# Patient Record
Sex: MALE | Race: WHITE | NOT HISPANIC OR LATINO | URBAN - METROPOLITAN AREA
[De-identification: names, ages, dates, MRNs, and addresses within clinical notes are randomized per-mention and may not be internally consistent; named-entity substitution may affect disease eponyms.]

---

## 2023-03-31 DIAGNOSIS — K21.9 GASTROESOPHAGEAL REFLUX DISEASE WITHOUT ESOPHAGITIS: Primary | ICD-10-CM

## 2023-03-31 RX ORDER — PANTOPRAZOLE SODIUM 40 MG/1
TABLET, DELAYED RELEASE ORAL
Qty: 90 TABLET | Refills: 0 | Status: SHIPPED | OUTPATIENT
Start: 2023-03-31 | End: 2023-04-04 | Stop reason: SDUPTHER

## 2023-04-04 DIAGNOSIS — K21.9 GASTROESOPHAGEAL REFLUX DISEASE WITHOUT ESOPHAGITIS: ICD-10-CM

## 2023-04-04 RX ORDER — CALCIUM CARBONATE/VITAMIN D3 600MG-5MCG
TABLET ORAL
COMMUNITY

## 2023-04-04 RX ORDER — AMLODIPINE BESYLATE 10 MG/1
10 TABLET ORAL DAILY
COMMUNITY
End: 2023-05-17 | Stop reason: SDUPTHER

## 2023-04-04 RX ORDER — CITALOPRAM 20 MG/1
20 TABLET, FILM COATED ORAL DAILY
COMMUNITY
End: 2023-05-17 | Stop reason: SDUPTHER

## 2023-04-04 RX ORDER — LISINOPRIL 10 MG/1
10 TABLET ORAL DAILY
COMMUNITY
End: 2023-05-17 | Stop reason: SDUPTHER

## 2023-04-04 RX ORDER — ACETAMINOPHEN 500 MG
1 TABLET ORAL DAILY
COMMUNITY

## 2023-04-04 RX ORDER — ATORVASTATIN CALCIUM 20 MG/1
20 TABLET, FILM COATED ORAL DAILY
COMMUNITY
End: 2023-05-17 | Stop reason: SDUPTHER

## 2023-04-04 RX ORDER — PANTOPRAZOLE SODIUM 40 MG/1
40 TABLET, DELAYED RELEASE ORAL
Qty: 90 TABLET | Refills: 0 | Status: SHIPPED | OUTPATIENT
Start: 2023-04-04 | End: 2023-05-17 | Stop reason: SDUPTHER

## 2023-05-16 PROBLEM — E78.5 HYPERLIPIDEMIA: Status: ACTIVE | Noted: 2023-05-16

## 2023-05-16 PROBLEM — B07.0 PLANTAR WART, RIGHT FOOT: Status: ACTIVE | Noted: 2023-05-16

## 2023-05-16 PROBLEM — R42 DIZZINESS: Status: ACTIVE | Noted: 2023-05-16

## 2023-05-16 PROBLEM — K21.9 ESOPHAGEAL REFLUX: Status: ACTIVE | Noted: 2023-05-16

## 2023-05-16 PROBLEM — Z98.890 H/O ENDARTERECTOMY: Status: ACTIVE | Noted: 2023-05-16

## 2023-05-16 PROBLEM — I10 BENIGN ESSENTIAL HYPERTENSION: Status: ACTIVE | Noted: 2023-05-16

## 2023-05-16 PROBLEM — E55.9 VITAMIN D DEFICIENCY: Status: ACTIVE | Noted: 2023-05-16

## 2023-05-16 PROBLEM — R73.01 IFG (IMPAIRED FASTING GLUCOSE): Status: ACTIVE | Noted: 2023-05-16

## 2023-05-16 PROBLEM — N18.31 STAGE 3A CHRONIC KIDNEY DISEASE (MULTI): Status: ACTIVE | Noted: 2023-05-16

## 2023-05-16 PROBLEM — N28.9 MILD RENAL INSUFFICIENCY: Status: ACTIVE | Noted: 2023-05-16

## 2023-05-16 PROBLEM — F32.9 MAJOR DEPRESSION, SINGLE EPISODE: Status: ACTIVE | Noted: 2023-05-16

## 2023-05-16 PROBLEM — R42 VERTIGO: Status: ACTIVE | Noted: 2023-05-16

## 2023-05-16 PROBLEM — L50.9 URTICARIA: Status: ACTIVE | Noted: 2023-05-16

## 2023-05-16 PROBLEM — F41.9 ANXIETY: Status: ACTIVE | Noted: 2023-05-16

## 2023-05-16 PROBLEM — I65.29 CAROTID STENOSIS: Status: ACTIVE | Noted: 2023-05-16

## 2023-05-16 PROBLEM — C91.10 CHRONIC LYMPHOCYTIC LEUKEMIA (MULTI): Status: ACTIVE | Noted: 2023-05-16

## 2023-05-16 PROBLEM — E11.21 DIABETES MELLITUS WITH NEPHROPATHY (MULTI): Status: ACTIVE | Noted: 2023-05-16

## 2023-05-16 PROBLEM — M85.80 OSTEOPENIA: Status: ACTIVE | Noted: 2023-05-16

## 2023-05-16 PROBLEM — R73.9 HYPERGLYCEMIA: Status: ACTIVE | Noted: 2023-05-16

## 2023-05-16 PROBLEM — L98.9 SKIN LESION: Status: ACTIVE | Noted: 2023-05-16

## 2023-05-16 PROBLEM — E03.8 SUBCLINICAL HYPOTHYROIDISM: Status: ACTIVE | Noted: 2023-05-16

## 2023-05-16 PROBLEM — E66.3 OVERWEIGHT: Status: ACTIVE | Noted: 2023-05-16

## 2023-05-16 PROBLEM — H61.21 IMPACTED CERUMEN OF RIGHT EAR: Status: ACTIVE | Noted: 2023-05-16

## 2023-05-17 ENCOUNTER — OFFICE VISIT (OUTPATIENT)
Dept: PRIMARY CARE | Facility: CLINIC | Age: 78
End: 2023-05-17
Payer: MEDICARE

## 2023-05-17 VITALS
WEIGHT: 178 LBS | BODY MASS INDEX: 26.36 KG/M2 | HEART RATE: 68 BPM | HEIGHT: 69 IN | OXYGEN SATURATION: 97 % | RESPIRATION RATE: 16 BRPM | SYSTOLIC BLOOD PRESSURE: 134 MMHG | TEMPERATURE: 97.6 F | DIASTOLIC BLOOD PRESSURE: 78 MMHG

## 2023-05-17 DIAGNOSIS — F41.9 ANXIETY: ICD-10-CM

## 2023-05-17 DIAGNOSIS — N18.31 STAGE 3A CHRONIC KIDNEY DISEASE (MULTI): ICD-10-CM

## 2023-05-17 DIAGNOSIS — I10 BENIGN ESSENTIAL HYPERTENSION: Primary | ICD-10-CM

## 2023-05-17 DIAGNOSIS — E11.21 DIABETES MELLITUS WITH NEPHROPATHY (MULTI): ICD-10-CM

## 2023-05-17 DIAGNOSIS — C91.10 CHRONIC LYMPHOCYTIC LEUKEMIA (MULTI): ICD-10-CM

## 2023-05-17 DIAGNOSIS — F33.41 RECURRENT MAJOR DEPRESSIVE DISORDER, IN PARTIAL REMISSION (CMS-HCC): ICD-10-CM

## 2023-05-17 DIAGNOSIS — E78.2 MIXED HYPERLIPIDEMIA: ICD-10-CM

## 2023-05-17 DIAGNOSIS — K21.9 GASTROESOPHAGEAL REFLUX DISEASE WITHOUT ESOPHAGITIS: ICD-10-CM

## 2023-05-17 PROBLEM — H61.21 IMPACTED CERUMEN OF RIGHT EAR: Status: RESOLVED | Noted: 2023-05-16 | Resolved: 2023-05-17

## 2023-05-17 PROBLEM — N28.9 MILD RENAL INSUFFICIENCY: Status: RESOLVED | Noted: 2023-05-16 | Resolved: 2023-05-17

## 2023-05-17 PROBLEM — F32.9 MAJOR DEPRESSION, SINGLE EPISODE: Status: RESOLVED | Noted: 2023-05-16 | Resolved: 2023-05-17

## 2023-05-17 PROCEDURE — 99214 OFFICE O/P EST MOD 30 MIN: CPT | Performed by: FAMILY MEDICINE

## 2023-05-17 PROCEDURE — 3075F SYST BP GE 130 - 139MM HG: CPT | Performed by: FAMILY MEDICINE

## 2023-05-17 PROCEDURE — 1159F MED LIST DOCD IN RCRD: CPT | Performed by: FAMILY MEDICINE

## 2023-05-17 PROCEDURE — 1036F TOBACCO NON-USER: CPT | Performed by: FAMILY MEDICINE

## 2023-05-17 PROCEDURE — 3078F DIAST BP <80 MM HG: CPT | Performed by: FAMILY MEDICINE

## 2023-05-17 RX ORDER — PANTOPRAZOLE SODIUM 40 MG/1
40 TABLET, DELAYED RELEASE ORAL
Qty: 90 TABLET | Refills: 1 | Status: SHIPPED | OUTPATIENT
Start: 2023-05-17 | End: 2023-08-29 | Stop reason: SDUPTHER

## 2023-05-17 RX ORDER — LISINOPRIL 10 MG/1
10 TABLET ORAL DAILY
Qty: 90 TABLET | Refills: 1 | Status: SHIPPED | OUTPATIENT
Start: 2023-05-17 | End: 2023-11-21 | Stop reason: SDUPTHER

## 2023-05-17 RX ORDER — AMLODIPINE BESYLATE 10 MG/1
10 TABLET ORAL DAILY
Qty: 90 TABLET | Refills: 1 | Status: SHIPPED | OUTPATIENT
Start: 2023-05-17 | End: 2023-11-21 | Stop reason: WASHOUT

## 2023-05-17 RX ORDER — ATORVASTATIN CALCIUM 20 MG/1
20 TABLET, FILM COATED ORAL DAILY
Qty: 90 TABLET | Refills: 1 | Status: SHIPPED | OUTPATIENT
Start: 2023-05-17 | End: 2023-08-29 | Stop reason: SDUPTHER

## 2023-05-17 RX ORDER — CITALOPRAM 20 MG/1
20 TABLET, FILM COATED ORAL DAILY
Qty: 90 TABLET | Refills: 1 | Status: SHIPPED | OUTPATIENT
Start: 2023-05-17 | End: 2023-11-21 | Stop reason: SDUPTHER

## 2023-05-17 ASSESSMENT — ENCOUNTER SYMPTOMS
BACK PAIN: 0
EYE REDNESS: 0
LOSS OF SENSATION IN FEET: 0
CHILLS: 0
DYSURIA: 0
FEVER: 0
WEAKNESS: 0
OCCASIONAL FEELINGS OF UNSTEADINESS: 0
SORE THROAT: 0
BLOOD IN STOOL: 0
BRUISES/BLEEDS EASILY: 0
COUGH: 0
HEADACHES: 0
DIARRHEA: 0
ABDOMINAL DISTENTION: 0
DEPRESSION: 0
ADENOPATHY: 0
CONSTIPATION: 0
ARTHRALGIAS: 0
APPETITE CHANGE: 0
DYSPHORIC MOOD: 0
CHEST TIGHTNESS: 0
NERVOUS/ANXIOUS: 0
DIFFICULTY URINATING: 0
SHORTNESS OF BREATH: 0
FATIGUE: 0
DIZZINESS: 0
EYE PAIN: 0
ABDOMINAL PAIN: 0

## 2023-05-17 NOTE — PROGRESS NOTES
"Subjective   Patient ID: Jon Lowe is a 78 y.o. male who presents for Follow-up.    HPI     Review of Systems    Objective   /78   Pulse 68   Temp 36.4 °C (97.6 °F)   Resp 16   Ht 1.753 m (5' 9\")   Wt 80.7 kg (178 lb)   SpO2 97%   BMI 26.29 kg/m²     Physical Exam    Assessment/Plan          "

## 2023-06-29 LAB — PROSTATE SPECIFIC ANTIGEN,SCREEN: 3.78 NG/ML (ref 0–4)

## 2023-06-30 ENCOUNTER — TELEPHONE (OUTPATIENT)
Dept: PRIMARY CARE | Facility: CLINIC | Age: 78
End: 2023-06-30
Payer: MEDICARE

## 2023-06-30 NOTE — TELEPHONE ENCOUNTER
----- Message from Lamonte Artis MD sent at 6/29/2023  7:38 PM EDT -----  PSA level is normal  ----- Message -----  From: Rich Parikh - Lab Results In  Sent: 6/29/2023   5:48 PM EDT  To: Lamonte Artis MD

## 2023-08-29 DIAGNOSIS — E78.2 MIXED HYPERLIPIDEMIA: ICD-10-CM

## 2023-08-29 DIAGNOSIS — K21.9 GASTROESOPHAGEAL REFLUX DISEASE WITHOUT ESOPHAGITIS: ICD-10-CM

## 2023-08-29 RX ORDER — PANTOPRAZOLE SODIUM 40 MG/1
40 TABLET, DELAYED RELEASE ORAL
Qty: 90 TABLET | Refills: 0 | Status: SHIPPED | OUTPATIENT
Start: 2023-08-29 | End: 2023-11-21 | Stop reason: SDUPTHER

## 2023-08-29 RX ORDER — ATORVASTATIN CALCIUM 20 MG/1
20 TABLET, FILM COATED ORAL DAILY
Qty: 90 TABLET | Refills: 0 | Status: SHIPPED | OUTPATIENT
Start: 2023-08-29 | End: 2023-11-21 | Stop reason: SDUPTHER

## 2023-11-20 ENCOUNTER — LAB (OUTPATIENT)
Dept: LAB | Facility: LAB | Age: 78
End: 2023-11-20
Payer: MEDICARE

## 2023-11-20 DIAGNOSIS — I10 BENIGN ESSENTIAL HYPERTENSION: ICD-10-CM

## 2023-11-20 DIAGNOSIS — E11.21 DIABETES MELLITUS WITH NEPHROPATHY (MULTI): ICD-10-CM

## 2023-11-20 DIAGNOSIS — E78.2 MIXED HYPERLIPIDEMIA: ICD-10-CM

## 2023-11-20 LAB
ALBUMIN SERPL BCP-MCNC: 4.7 G/DL (ref 3.4–5)
ALP SERPL-CCNC: 83 U/L (ref 33–136)
ALT SERPL W P-5'-P-CCNC: 17 U/L (ref 10–52)
ANION GAP SERPL CALC-SCNC: 14 MMOL/L (ref 10–20)
APPEARANCE UR: CLEAR
AST SERPL W P-5'-P-CCNC: 18 U/L (ref 9–39)
BILIRUB SERPL-MCNC: 0.7 MG/DL (ref 0–1.2)
BILIRUB UR STRIP.AUTO-MCNC: NEGATIVE MG/DL
BUN SERPL-MCNC: 27 MG/DL (ref 6–23)
CALCIUM SERPL-MCNC: 9.5 MG/DL (ref 8.6–10.6)
CHLORIDE SERPL-SCNC: 102 MMOL/L (ref 98–107)
CHOLEST SERPL-MCNC: 126 MG/DL (ref 0–199)
CHOLESTEROL/HDL RATIO: 3
CO2 SERPL-SCNC: 29 MMOL/L (ref 21–32)
COLOR UR: YELLOW
CREAT SERPL-MCNC: 1.46 MG/DL (ref 0.5–1.3)
CREAT UR-MCNC: 127.2 MG/DL (ref 20–370)
EST. AVERAGE GLUCOSE BLD GHB EST-MCNC: 134 MG/DL
GFR SERPL CREATININE-BSD FRML MDRD: 49 ML/MIN/1.73M*2
GLUCOSE SERPL-MCNC: 98 MG/DL (ref 74–99)
GLUCOSE UR STRIP.AUTO-MCNC: NEGATIVE MG/DL
HBA1C MFR BLD: 6.3 %
HDLC SERPL-MCNC: 41.4 MG/DL
KETONES UR STRIP.AUTO-MCNC: NEGATIVE MG/DL
LDLC SERPL CALC-MCNC: 56 MG/DL
LEUKOCYTE ESTERASE UR QL STRIP.AUTO: NEGATIVE
MICROALBUMIN UR-MCNC: <7 MG/L
MICROALBUMIN/CREAT UR: NORMAL MG/G{CREAT}
NITRITE UR QL STRIP.AUTO: NEGATIVE
NON HDL CHOLESTEROL: 85 MG/DL (ref 0–149)
PH UR STRIP.AUTO: 5 [PH]
POTASSIUM SERPL-SCNC: 4.9 MMOL/L (ref 3.5–5.3)
PROT SERPL-MCNC: 6.2 G/DL (ref 6.4–8.2)
PROT UR STRIP.AUTO-MCNC: NEGATIVE MG/DL
RBC # UR STRIP.AUTO: NEGATIVE /UL
SODIUM SERPL-SCNC: 140 MMOL/L (ref 136–145)
SP GR UR STRIP.AUTO: 1.02
TRIGL SERPL-MCNC: 143 MG/DL (ref 0–149)
UROBILINOGEN UR STRIP.AUTO-MCNC: <2 MG/DL
VLDL: 29 MG/DL (ref 0–40)

## 2023-11-20 PROCEDURE — 81003 URINALYSIS AUTO W/O SCOPE: CPT

## 2023-11-20 PROCEDURE — 82043 UR ALBUMIN QUANTITATIVE: CPT

## 2023-11-20 PROCEDURE — 83036 HEMOGLOBIN GLYCOSYLATED A1C: CPT

## 2023-11-20 PROCEDURE — 82570 ASSAY OF URINE CREATININE: CPT

## 2023-11-20 PROCEDURE — 36415 COLL VENOUS BLD VENIPUNCTURE: CPT

## 2023-11-20 PROCEDURE — 80053 COMPREHEN METABOLIC PANEL: CPT

## 2023-11-20 PROCEDURE — 80061 LIPID PANEL: CPT

## 2023-11-21 ENCOUNTER — OFFICE VISIT (OUTPATIENT)
Dept: PRIMARY CARE | Facility: CLINIC | Age: 78
End: 2023-11-21
Payer: MEDICARE

## 2023-11-21 VITALS
WEIGHT: 180 LBS | RESPIRATION RATE: 12 BRPM | BODY MASS INDEX: 26.66 KG/M2 | OXYGEN SATURATION: 95 % | HEART RATE: 74 BPM | HEIGHT: 69 IN | DIASTOLIC BLOOD PRESSURE: 76 MMHG | SYSTOLIC BLOOD PRESSURE: 134 MMHG

## 2023-11-21 DIAGNOSIS — I10 BENIGN ESSENTIAL HYPERTENSION: ICD-10-CM

## 2023-11-21 DIAGNOSIS — Z86.73 HISTORY OF EMBOLIC STROKE: ICD-10-CM

## 2023-11-21 DIAGNOSIS — F33.41 RECURRENT MAJOR DEPRESSIVE DISORDER, IN PARTIAL REMISSION (CMS-HCC): ICD-10-CM

## 2023-11-21 DIAGNOSIS — K21.9 GASTROESOPHAGEAL REFLUX DISEASE WITHOUT ESOPHAGITIS: ICD-10-CM

## 2023-11-21 DIAGNOSIS — C91.10 CHRONIC LYMPHOCYTIC LEUKEMIA (MULTI): ICD-10-CM

## 2023-11-21 DIAGNOSIS — N18.31 STAGE 3A CHRONIC KIDNEY DISEASE (MULTI): ICD-10-CM

## 2023-11-21 DIAGNOSIS — E11.21 DIABETES MELLITUS WITH NEPHROPATHY (MULTI): Primary | ICD-10-CM

## 2023-11-21 DIAGNOSIS — D72.829 LEUKOCYTOSIS, UNSPECIFIED TYPE: ICD-10-CM

## 2023-11-21 DIAGNOSIS — K21.9 GASTROESOPHAGEAL REFLUX DISEASE, UNSPECIFIED WHETHER ESOPHAGITIS PRESENT: ICD-10-CM

## 2023-11-21 DIAGNOSIS — E03.8 SUBCLINICAL HYPOTHYROIDISM: ICD-10-CM

## 2023-11-21 DIAGNOSIS — F41.9 ANXIETY: ICD-10-CM

## 2023-11-21 DIAGNOSIS — E78.2 MIXED HYPERLIPIDEMIA: ICD-10-CM

## 2023-11-21 PROBLEM — J40 BRONCHITIS: Status: ACTIVE | Noted: 2023-11-21

## 2023-11-21 PROBLEM — L21.9 SEBORRHEIC DERMATITIS, UNSPECIFIED: Status: ACTIVE | Noted: 2022-06-02

## 2023-11-21 PROBLEM — E87.1 HYPONATREMIA: Status: ACTIVE | Noted: 2023-11-21

## 2023-11-21 PROBLEM — D22.5 MELANOCYTIC NEVI OF TRUNK: Status: ACTIVE | Noted: 2022-06-02

## 2023-11-21 PROBLEM — D48.5 NEOPLASM OF UNCERTAIN BEHAVIOR OF SKIN: Status: ACTIVE | Noted: 2022-06-02

## 2023-11-21 PROBLEM — G45.9 TRANSIENT ISCHEMIC ATTACK: Status: ACTIVE | Noted: 2023-11-21

## 2023-11-21 PROBLEM — T78.40XA ALLERGIC REACTION: Status: ACTIVE | Noted: 2023-11-21

## 2023-11-21 PROBLEM — L23.9 ALLERGIC CONTACT DERMATITIS: Status: ACTIVE | Noted: 2023-11-21

## 2023-11-21 PROBLEM — Z85.6 PERSONAL HISTORY OF CLL (CHRONIC LYMPHOCYTIC LEUKEMIA): Status: ACTIVE | Noted: 2023-11-21

## 2023-11-21 PROBLEM — D22.4 MELANOCYTIC NEVI OF SCALP AND NECK: Status: ACTIVE | Noted: 2022-06-02

## 2023-11-21 PROBLEM — J40 BRONCHITIS: Status: RESOLVED | Noted: 2023-11-21 | Resolved: 2023-11-21

## 2023-11-21 PROBLEM — R73.01 IFG (IMPAIRED FASTING GLUCOSE): Status: RESOLVED | Noted: 2023-05-16 | Resolved: 2023-11-21

## 2023-11-21 PROBLEM — D22.60 MELANOCYTIC NEVI OF UNSPECIFIED UPPER LIMB, INCLUDING SHOULDER: Status: ACTIVE | Noted: 2022-06-02

## 2023-11-21 PROBLEM — L57.0 ACTINIC KERATOSIS: Status: ACTIVE | Noted: 2022-06-02

## 2023-11-21 PROBLEM — E87.1 HYPONATREMIA: Status: RESOLVED | Noted: 2023-11-21 | Resolved: 2023-11-21

## 2023-11-21 PROBLEM — R73.9 HYPERGLYCEMIA: Status: RESOLVED | Noted: 2023-05-16 | Resolved: 2023-11-21

## 2023-11-21 PROBLEM — L23.89 ALLERGIC CONTACT DERMATITIS DUE TO OTHER AGENTS: Status: ACTIVE | Noted: 2022-06-02

## 2023-11-21 PROBLEM — R42 DIZZINESS: Status: RESOLVED | Noted: 2023-05-16 | Resolved: 2023-11-21

## 2023-11-21 PROBLEM — I63.9 EMBOLIC STROKE (MULTI): Status: RESOLVED | Noted: 2023-11-21 | Resolved: 2023-11-21

## 2023-11-21 PROBLEM — I63.9 EMBOLIC STROKE (MULTI): Status: ACTIVE | Noted: 2023-11-21

## 2023-11-21 PROBLEM — G45.9 TRANSIENT ISCHEMIC ATTACK: Status: RESOLVED | Noted: 2023-11-21 | Resolved: 2023-11-21

## 2023-11-21 PROBLEM — L57.9 SKIN CHANGES DUE TO CHRONIC EXPOSURE TO NONIONIZING RADIATION, UNSPECIFIED: Status: ACTIVE | Noted: 2022-06-02

## 2023-11-21 PROBLEM — Z85.6 HISTORY OF LEUKEMIA: Status: ACTIVE | Noted: 2023-11-21

## 2023-11-21 PROBLEM — I16.0 HYPERTENSIVE URGENCY: Status: ACTIVE | Noted: 2023-11-21

## 2023-11-21 PROBLEM — L82.1 OTHER SEBORRHEIC KERATOSIS: Status: ACTIVE | Noted: 2022-06-02

## 2023-11-21 PROCEDURE — 3075F SYST BP GE 130 - 139MM HG: CPT | Performed by: FAMILY MEDICINE

## 2023-11-21 PROCEDURE — 1159F MED LIST DOCD IN RCRD: CPT | Performed by: FAMILY MEDICINE

## 2023-11-21 PROCEDURE — 3078F DIAST BP <80 MM HG: CPT | Performed by: FAMILY MEDICINE

## 2023-11-21 PROCEDURE — 99214 OFFICE O/P EST MOD 30 MIN: CPT | Performed by: FAMILY MEDICINE

## 2023-11-21 PROCEDURE — 1036F TOBACCO NON-USER: CPT | Performed by: FAMILY MEDICINE

## 2023-11-21 RX ORDER — PANTOPRAZOLE SODIUM 40 MG/1
40 TABLET, DELAYED RELEASE ORAL
Qty: 90 TABLET | Refills: 1 | Status: SHIPPED | OUTPATIENT
Start: 2023-11-21 | End: 2024-05-23 | Stop reason: SDUPTHER

## 2023-11-21 RX ORDER — CALCIUM CARBONATE/VITAMIN D3 500-10/5ML
400 LIQUID (ML) ORAL DAILY
COMMUNITY

## 2023-11-21 RX ORDER — SODIUM CHLORIDE 50 MG/ML
SOLUTION/ DROPS OPHTHALMIC
COMMUNITY

## 2023-11-21 RX ORDER — ATORVASTATIN CALCIUM 20 MG/1
20 TABLET, FILM COATED ORAL DAILY
Qty: 90 TABLET | Refills: 1 | Status: SHIPPED | OUTPATIENT
Start: 2023-11-21 | End: 2024-05-23 | Stop reason: SDUPTHER

## 2023-11-21 RX ORDER — KETOTIFEN FUMARATE 0.35 MG/ML
1 SOLUTION/ DROPS OPHTHALMIC
COMMUNITY
Start: 2022-08-24 | End: 2024-02-08 | Stop reason: ALTCHOICE

## 2023-11-21 RX ORDER — CITALOPRAM 20 MG/1
20 TABLET, FILM COATED ORAL DAILY
Qty: 90 TABLET | Refills: 1 | Status: SHIPPED | OUTPATIENT
Start: 2023-11-21 | End: 2024-05-23 | Stop reason: SDUPTHER

## 2023-11-21 RX ORDER — TRIAMCINOLONE ACETONIDE 1 MG/G
CREAM TOPICAL
COMMUNITY
Start: 2018-02-27

## 2023-11-21 RX ORDER — LISINOPRIL 10 MG/1
10 TABLET ORAL DAILY
Qty: 90 TABLET | Refills: 1 | Status: SHIPPED | OUTPATIENT
Start: 2023-11-21 | End: 2024-05-23 | Stop reason: SDUPTHER

## 2023-11-21 ASSESSMENT — ENCOUNTER SYMPTOMS
WEAKNESS: 0
FATIGUE: 0
SHORTNESS OF BREATH: 0
HEADACHES: 0
BACK PAIN: 0
ADENOPATHY: 0
ARTHRALGIAS: 0
BLOOD IN STOOL: 0
DYSURIA: 0
DIZZINESS: 0
DIFFICULTY URINATING: 0
EYE REDNESS: 0
EYE PAIN: 0
APPETITE CHANGE: 0
DIARRHEA: 0
CHEST TIGHTNESS: 0
SORE THROAT: 0
NERVOUS/ANXIOUS: 1
DYSPHORIC MOOD: 0
CHILLS: 0
CONSTIPATION: 0
COUGH: 0
ABDOMINAL DISTENTION: 0
FEVER: 0
BRUISES/BLEEDS EASILY: 0
ABDOMINAL PAIN: 0

## 2023-11-21 NOTE — PROGRESS NOTES
Subjective   Patient ID: Jon Lowe is a 78 y.o. male who presents for Follow-up.  Pt is here for f/u Type 2 diabetes.   Pt is following low carb diet, and is exercising 0 days per week, physically.  Taking no medcation.   0 x daily accucheks .  A1c 6.3  Eye exam is current  Pt does not have foot pain, paresthesias, or numbness in feet. Foot checks daily - yes.  Following with podiatry -  no   Denies vision changes, abdominal pain, excessive thirst, frequent urination.     Pt has chronic and stable HTN, hx CEA for carotid stenosis/CVA.   Pt is taking Lisinopril, he stopped Amlodipine. Tolerating well.  Exercising 0 days per week   Low sodium diet is usually being followed.   Is monitoring home blood pressures. Readings range 120-130s.70-80s.  Denies HA, vision changes or CP.     Pt has Dyslipidemia.   Lipid panel showed LDL 54.  Currently taking no statin.     Pt has CRI. It is stable per GFR.    For CLL he had stable CT, monitoring WBC with  Oncology.     Pt has chronic and stable depression / anxiety.  Pt has supportive family/friends.   Pt not seeing a counselor.   Taking Celexa and it is helping.   Mood, energy, motivation, interests, sleep and appetite are adequate. Anxiety is stable.  Pt denies suicidal ideations.     GERD is well controlled on pantoprazole . Pt is taking medication daily. Denies epigastric pain, nausea, heartburn or water brash.         Review of Systems   Constitutional:  Negative for appetite change, chills, fatigue and fever.   HENT:  Negative for congestion, hearing loss and sore throat.    Eyes:  Negative for pain, redness and visual disturbance.   Respiratory:  Negative for cough, chest tightness and shortness of breath.    Cardiovascular:  Negative for chest pain and leg swelling.   Gastrointestinal:  Negative for abdominal distention, abdominal pain, blood in stool, constipation and diarrhea.   Genitourinary:  Negative for difficulty urinating and dysuria.  "  Musculoskeletal:  Negative for arthralgias and back pain.   Skin:  Negative for rash.   Neurological:  Negative for dizziness, weakness and headaches.   Hematological:  Negative for adenopathy. Does not bruise/bleed easily.   Psychiatric/Behavioral:  Negative for dysphoric mood. The patient is nervous/anxious.        Objective   /76   Pulse 74   Resp 12   Ht 1.753 m (5' 9\")   Wt 81.6 kg (180 lb)   SpO2 95%   BMI 26.58 kg/m²    Physical Exam  Constitutional:       General: He is not in acute distress.     Appearance: Normal appearance.   Cardiovascular:      Rate and Rhythm: Normal rate and regular rhythm.      Heart sounds: Normal heart sounds. No murmur heard.  Pulmonary:      Effort: Pulmonary effort is normal.      Breath sounds: Normal breath sounds.   Abdominal:      Palpations: Abdomen is soft.      Tenderness: There is no abdominal tenderness.   Neurological:      Mental Status: He is alert.   Psychiatric:         Mood and Affect: Mood normal.         Judgment: Judgment normal.           Assessment/Plan   Diagnoses and all orders for this visit:  Diabetes mellitus with nephropathy - stable with diet control, continue to monitor with labs.   Benign essential hypertension/hx CVA/CEA - well controlled off Amlodipine, continue Lisinopril and monitor.  Mixed hyperlipidemia - very well controlled with Atorvastatin, continue and monitor with labs.  Subclinical hypothyroidism - stable, asymptomatic, monitor with labs  Gastroesophageal reflux disease, -stable with Pantoprazole  Stage 3a chronic kidney disease - GFR stable, continue to monitor  Chronic lymphocytic leukemia - monitoring with labs with oncology  Recurrent major depressive disorder/Anxiety- stable with Celexa, monitor  Follow up in 6months, 30min. Preventative due in February       "

## 2023-11-21 NOTE — PROGRESS NOTES
"Subjective   Patient ID: Jon Lowe is a 78 y.o. male who presents for Follow-up.    HPI     Review of Systems    Objective   /76   Pulse 74   Resp 12   Ht 1.753 m (5' 9\")   Wt 81.6 kg (180 lb)   SpO2 95%   BMI 26.58 kg/m²     Physical Exam    Assessment/Plan          "

## 2023-11-28 ENCOUNTER — OFFICE VISIT (OUTPATIENT)
Dept: PRIMARY CARE | Facility: CLINIC | Age: 78
End: 2023-11-28
Payer: MEDICARE

## 2023-11-28 DIAGNOSIS — Z23 ENCOUNTER FOR IMMUNIZATION: ICD-10-CM

## 2023-11-28 PROCEDURE — G0008 ADMIN INFLUENZA VIRUS VAC: HCPCS | Performed by: FAMILY MEDICINE

## 2023-11-28 PROCEDURE — 1036F TOBACCO NON-USER: CPT | Performed by: FAMILY MEDICINE

## 2023-11-28 PROCEDURE — 90686 IIV4 VACC NO PRSV 0.5 ML IM: CPT | Performed by: FAMILY MEDICINE

## 2023-11-28 PROCEDURE — 1159F MED LIST DOCD IN RCRD: CPT | Performed by: FAMILY MEDICINE

## 2023-12-21 ENCOUNTER — APPOINTMENT (OUTPATIENT)
Dept: HEMATOLOGY/ONCOLOGY | Facility: HOSPITAL | Age: 78
End: 2023-12-21
Payer: MEDICARE

## 2024-02-08 ENCOUNTER — OFFICE VISIT (OUTPATIENT)
Dept: HEMATOLOGY/ONCOLOGY | Facility: HOSPITAL | Age: 79
End: 2024-02-08
Payer: MEDICARE

## 2024-02-08 ENCOUNTER — LAB (OUTPATIENT)
Dept: LAB | Facility: HOSPITAL | Age: 79
End: 2024-02-08
Payer: MEDICARE

## 2024-02-08 VITALS
HEART RATE: 74 BPM | TEMPERATURE: 97.5 F | WEIGHT: 177.69 LBS | BODY MASS INDEX: 26.24 KG/M2 | SYSTOLIC BLOOD PRESSURE: 150 MMHG | RESPIRATION RATE: 16 BRPM | OXYGEN SATURATION: 95 % | DIASTOLIC BLOOD PRESSURE: 79 MMHG

## 2024-02-08 DIAGNOSIS — C91.10 CHRONIC LYMPHOCYTIC LEUKEMIA (MULTI): Primary | ICD-10-CM

## 2024-02-08 DIAGNOSIS — C91.10 CHRONIC LYMPHOCYTIC LEUKEMIA (MULTI): ICD-10-CM

## 2024-02-08 LAB
ALBUMIN SERPL BCP-MCNC: 4.7 G/DL (ref 3.4–5)
ALP SERPL-CCNC: 94 U/L (ref 33–136)
ALT SERPL W P-5'-P-CCNC: 21 U/L (ref 10–52)
ANION GAP SERPL CALC-SCNC: 14 MMOL/L (ref 10–20)
AST SERPL W P-5'-P-CCNC: 19 U/L (ref 9–39)
BASOPHILS # BLD AUTO: 0.12 X10*3/UL (ref 0–0.1)
BASOPHILS NFR BLD AUTO: 0.3 %
BILIRUB SERPL-MCNC: 0.8 MG/DL (ref 0–1.2)
BUN SERPL-MCNC: 19 MG/DL (ref 6–23)
CALCIUM SERPL-MCNC: 9.4 MG/DL (ref 8.6–10.3)
CHLORIDE SERPL-SCNC: 101 MMOL/L (ref 98–107)
CO2 SERPL-SCNC: 30 MMOL/L (ref 21–32)
CREAT SERPL-MCNC: 1.31 MG/DL (ref 0.5–1.3)
EGFRCR SERPLBLD CKD-EPI 2021: 55 ML/MIN/1.73M*2
EOSINOPHIL # BLD AUTO: 0.2 X10*3/UL (ref 0–0.4)
EOSINOPHIL NFR BLD AUTO: 0.5 %
ERYTHROCYTE [DISTWIDTH] IN BLOOD BY AUTOMATED COUNT: 14.2 % (ref 11.5–14.5)
GLUCOSE SERPL-MCNC: 108 MG/DL (ref 74–99)
HCT VFR BLD AUTO: 43 % (ref 41–52)
HGB BLD-MCNC: 14.2 G/DL (ref 13.5–17.5)
IMM GRANULOCYTES # BLD AUTO: 0.1 X10*3/UL (ref 0–0.5)
IMM GRANULOCYTES NFR BLD AUTO: 0.3 % (ref 0–0.9)
LDH SERPL L TO P-CCNC: 152 U/L (ref 84–246)
LYMPHOCYTES # BLD AUTO: 33.24 X10*3/UL (ref 0.8–3)
LYMPHOCYTES NFR BLD AUTO: 89.4 %
MCH RBC QN AUTO: 29.5 PG (ref 26–34)
MCHC RBC AUTO-ENTMCNC: 33 G/DL (ref 32–36)
MCV RBC AUTO: 89 FL (ref 80–100)
MONOCYTES # BLD AUTO: 0.14 X10*3/UL (ref 0.05–0.8)
MONOCYTES NFR BLD AUTO: 0.4 %
NEUTROPHILS # BLD AUTO: 3.38 X10*3/UL (ref 1.6–5.5)
NEUTROPHILS NFR BLD AUTO: 9.1 %
NRBC BLD-RTO: 0 /100 WBCS (ref 0–0)
PLATELET # BLD AUTO: 126 X10*3/UL (ref 150–450)
POTASSIUM SERPL-SCNC: 4.6 MMOL/L (ref 3.5–5.3)
PROT SERPL-MCNC: 6.6 G/DL (ref 6.4–8.2)
RBC # BLD AUTO: 4.82 X10*6/UL (ref 4.5–5.9)
RBC MORPH BLD: NORMAL
SODIUM SERPL-SCNC: 140 MMOL/L (ref 136–145)
WBC # BLD AUTO: 37.2 X10*3/UL (ref 4.4–11.3)

## 2024-02-08 PROCEDURE — 1036F TOBACCO NON-USER: CPT

## 2024-02-08 PROCEDURE — 1160F RVW MEDS BY RX/DR IN RCRD: CPT

## 2024-02-08 PROCEDURE — 36415 COLL VENOUS BLD VENIPUNCTURE: CPT

## 2024-02-08 PROCEDURE — 80053 COMPREHEN METABOLIC PANEL: CPT

## 2024-02-08 PROCEDURE — 1126F AMNT PAIN NOTED NONE PRSNT: CPT

## 2024-02-08 PROCEDURE — 85025 COMPLETE CBC W/AUTO DIFF WBC: CPT

## 2024-02-08 PROCEDURE — 99214 OFFICE O/P EST MOD 30 MIN: CPT

## 2024-02-08 PROCEDURE — 83615 LACTATE (LD) (LDH) ENZYME: CPT

## 2024-02-08 PROCEDURE — 3077F SYST BP >= 140 MM HG: CPT

## 2024-02-08 PROCEDURE — 3078F DIAST BP <80 MM HG: CPT

## 2024-02-08 PROCEDURE — 1159F MED LIST DOCD IN RCRD: CPT

## 2024-02-08 ASSESSMENT — ENCOUNTER SYMPTOMS
DIARRHEA: 0
APPETITE CHANGE: 0
BLOOD IN STOOL: 0
HEMATURIA: 0
PALPITATIONS: 1
DYSURIA: 0
SHORTNESS OF BREATH: 0
DIAPHORESIS: 0
ADENOPATHY: 1
CONSTIPATION: 0
CHILLS: 0
FATIGUE: 1
DIZZINESS: 0
COUGH: 0
NUMBNESS: 0
UNEXPECTED WEIGHT CHANGE: 0
LEG SWELLING: 0
WHEEZING: 0
FEVER: 0
NAUSEA: 0
VOMITING: 0
LIGHT-HEADEDNESS: 0
WOUND: 0

## 2024-02-08 ASSESSMENT — PAIN SCALES - GENERAL: PAINLEVEL: 0-NO PAIN

## 2024-02-08 NOTE — PROGRESS NOTES
Patient ID: Jon Lowe is a 78 y.o. male.    Treatment:   Oncology History Overview Note   Cancer History:          Leukemia - Chronic Lymphocytic         AJCC Edition: 7th, Diagnosis Date: 07-Oct-1993, Stage 1,         Treatment Synopsis:    1. .Hagen stage II CLL in complete remission after four cycles of fludarabine and Rituxan, completing in October 2004.  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  2 Stroke in January 2018 with loss of feeling in left arm and has residual numbness on left hip.  He has some mild residual word finding when he gets tired.  He had a right  carotid endarterectomy 1/24/18 at [facility] and he was on plavix temporarily.         Treatment History:    1. Chronic lymphocytic leukemia, Hagen stage II, diagnosed in 1994, and developed lymphadenopathy in May of 2004, and then underwent a bone marrow biopsy  examination that revealed 80% involvement with chronic lymphocytic leukemia. The patient is CD30 negative and ZAP-70 positive. The patient received treatment with FND plus Rituxan therapy on June 8, 2004, which was complicated by nausea, vomiting, and  gastrointestinal bleeding. This therapy was changed to fludarabine and Rituxan, and he received a total of four cycles from August 2, 2004, through October 11, 2004. Repeat bone marrow biopsy examination on August 2, 2004, demonstrated no evidence of  tumorous involvement either by pathologic review or flow cytometric analysis. The patient's chemotherapy course, however, was complicated by a prolonged pancytopenia.   2. Herpes zoster involving the T6 and T8 dermatomes in April 2006, then re-involvement of the left shoulder and arm in 2006.   3. Hypertension.   4. History of ichthyosis vulgaris since infancy, evaluated by Dr. Dawson Smith of Dermatology. 5. Status post multiple nevi.   6. Osteopenia.   7. Seasonal allergies.   8. History of gastroesophageal reflux disease.   9. Stroke in January 2018 with loss of feeling  in left arm and has residual numbness on left hip.  He has some mild residual word finding when he gets tired.  He had a right carotid endarterectomy  1/24/18 at [facility].        Chronic lymphocytic leukemia (CMS/[facility])   5/16/2023 Initial Diagnosis    Chronic lymphocytic leukemia (CMS/[facility])       Response:     Past Medical History:  Herpes zoster involving the T6 and T8 dermatomes in April 2006, then re-involvement of the left shoulder and arm in 2006, Hypertension, History of ichthyosis vulgaris since infancy, evaluated by Dr. Dawson Smith of Dermatology, Status post multiple nevi, Osteopenia, Seasonal allergies, History of gastroesophageal reflux disease, Stroke in January 2018 with loss of feeling in left arm and has residual numbness on left hip, He has some mild residual word finding when he gets tired.  He had a right carotid endarterectomy 1/24/18 at [facility].    Family History:  No family history on file.    Social History:  Wife passes away about 11 years ago.  Lives alone.  Son lives in [place].  -------------------------------------------------------------------------------------------------------  Subjective       HPI    Jon Lowe is a 78 year old male with CLL, s/p treatment with fludarabine and rituxan for 4 cycles completed in 2004.    Jon presents to the clinic today (2/8/24) for follow up for his CLL and blood work.  Reports he is doing okay, however explained how he put down his cat that he first got after his wife passed away 11 years ago.  Explained that it is difficult losing his cat.  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      No recent illnesses, ED visits, or hospitalizations.  Reports his energy level is low at times.  Appetite is good and has had no weight loss.  Continues to feel lymph nodes in neck but states that his health care providers typically do not feel them.  Has chronic right shoulder pain, takes  tylenol at needed.  Occasional feels heart palpitations and prior workup was negative for afib.  Has a heart rhythm monitor at home that he periodically checks.      Review of Systems   Constitutional:  Positive for fatigue. Negative for appetite change, chills, diaphoresis, fever and unexpected weight change.   Respiratory:  Negative for cough, shortness of breath and wheezing.    Cardiovascular:  Positive for palpitations (occassional heart palpitations). Negative for chest pain and leg swelling.   Gastrointestinal:  Negative for blood in stool, constipation, diarrhea, nausea and vomiting.   Genitourinary:  Negative for dysuria and hematuria.    Musculoskeletal:  Negative for gait problem.   Skin:  Negative for rash and wound.   Neurological:  Negative for dizziness, gait problem, light-headedness and numbness.   Hematological:  Positive for adenopathy (can feel in the neck).      -------------------------------------------------------------------------------------------------------  Objective   BSA: 1.98 meters squared  /79   Pulse 74   Temp 36.4 °C (97.5 °F)   Resp 16   Wt 80.6 kg (177 lb 11.1 oz)   SpO2 90%   BMI 26.24 kg/m²     Physical Exam  Vitals reviewed.   Constitutional:       Appearance: Normal appearance.   HENT:      Head: Normocephalic.   Eyes:      General: No scleral icterus.     Pupils: Pupils are equal, round, and reactive to light.   Cardiovascular:      Rate and Rhythm: Normal rate and regular rhythm.      Pulses: Normal pulses.      Heart sounds: Normal heart sounds. No murmur heard.     No friction rub. No gallop.   Pulmonary:      Effort: Pulmonary effort is normal. No respiratory distress.      Breath sounds: Normal breath sounds. No wheezing.   Abdominal:      General: Abdomen is flat. Bowel sounds are normal. There is no distension.      Palpations: Abdomen is soft. There is no mass.      Tenderness: There is no abdominal tenderness.   Musculoskeletal:         General: No  swelling. Normal range of motion.   Lymphadenopathy:      Comments: Small palpable, non-tender lymph nodes to bilateral cervical region.     Skin:     General: Skin is warm and dry.   Neurological:      General: No focal deficit present.      Mental Status: He is alert and oriented to person, place, and time.   Psychiatric:         Mood and Affect: Mood normal.         Behavior: Behavior normal.       Performance Status:  ECOG Performance Status: 1 restricted from physically strenuous work  -------------------------------------------------------------------------------------------------------  Assessment/Plan      1. Chronic lymphocytic leukemia, Hagen stage II, in complete remission, status post four  cycles of fludarabine and Rituxan from August 2004 through October 2004. The patient continues to have relatively stable blood counts without  evidence of white blood cell count doubling in six months. Stable hgb. No recurrent infections. Additionally, no  splenomegaly on examination & stable, palpable cervical lymphadenopathy.     CT neck (1/21/18) - numerous cervical lymph nodes. For example,  there are submandibular lymph nodes measuring up to 1.8 cm in greatest axial dimension on the right. There is a right level 3 lymph node measuring approximately 2.4 cm in greatest axial dimension. Its posterior margin is somewhat indistinct. There are  also numerous supraclavicular, axillary, and posterior pectoralis lymph nodes.    Recent WBC/lymphocyte count (6/29/23) are relatively stable (34/29) compared to previous values : 31/26 on 2/9/23, 28.3/22.67 on 5/16/22, 29.1/23.51 on 11/1/21,  26.8/22 on 5/3/21, 20.6/15.92,on 11/2/20,  24.2/17.89 on 5/4/20, 21.8/15.91 on 11/4/19, &  25.2/19.4 on 11/8/18).    He has preserved hgb (14.8) & stable mild thrombocytopenia (125k, 137k 180k, 140k, 128k, 124k, & 121k).  No indication for treatment at this time.    Annual carotid artery duplex ultrasound (6/19/23) - Multiple enlarged lymph  nodes visualized bilaterally within the neck. The largest measurment on the right side is 9.60 mm x 31.26 mm. The largest measurement on the left side is 12.20 mm x 21.69 mm.     WBC   Date Value Ref Range Status   02/08/2024 37.2 (H) 4.4 - 11.3 x10*3/uL Final   06/29/2023 34.0 (H) 4.4 - 11.3 x10E9/L Final   02/09/2023 30.9 (H) 4.4 - 11.3 x10E9/L Final     Absolute Neutrophil Count   Date Value Ref Range Status   01/15/2022 3.45 1.60 - 5.50 x10E9/L Final   12/29/2021 4.82 1.60 - 5.50 x10E9/L Final   04/21/2018 3.18 X10E9/L Final     Hemoglobin   Date Value Ref Range Status   02/08/2024 14.2 13.5 - 17.5 g/dL Final   06/29/2023 14.8 13.5 - 17.5 g/dL Final   02/09/2023 14.4 13.5 - 17.5 g/dL Final     Platelets   Date Value Ref Range Status   02/08/2024 126 (L) 150 - 450 x10*3/uL Final   06/29/2023 125 (L) 150 - 450 x10E9/L Final   02/09/2023 137 (L) 150 - 450 x10E9/L Final     2/8/24:  Today continues to have small stable palpable cervical lymph nodes, no other findings on examination.  Continue to follow up in 6 months.      2. Hypertensive - despite current BP regimen, management of recent stroke with bp control and aspirin.  Instructed patient to monitor &  log BP at home, then follow up with his PCP. Reports today (5/16/22) that he is now on Amlodipine & Lisinopril.    3. Pneumovax (10/06/14). Patient will have seasonal influenza vaccination provided by his PCP. Needs pneumovax updated.  Advised to get shingrix vaccine again.  He recv'd his first dose of covid vaccination (Moderna) in April 2021, but has not recv'd  his second dose yet.     4. Dermatology: diffuse actinic keratoses. Follows with Dermatology and his moles were evaluated. He will continue with a yearly full body evaluation.  Using Acyclovir as needed since recent zoster outbreak in May 2019.    History of Stroke 2018, Bilateral carotid Artery Stenosis:  - Following with vascular surgery, obtains yearly carotid duplex, due again June 2024.     -vascular surgeon advised to stop aspirin r/t GI issues    Healthcare Maintenance:  Received influenza 11/28/23.   Advised to obtain updated covid, RSV, Prevnar 20, and shingrix vaccines at local pharmacy.      RTC:  Follow up visit in 6 months with blood work prior, advised to call with any new concerns/issues.    -------------------------------------------------------------------------------------------------------  WAYNE Lowery

## 2024-02-19 ENCOUNTER — OFFICE VISIT (OUTPATIENT)
Dept: PRIMARY CARE | Facility: CLINIC | Age: 79
End: 2024-02-19
Payer: MEDICARE

## 2024-02-19 VITALS
DIASTOLIC BLOOD PRESSURE: 76 MMHG | SYSTOLIC BLOOD PRESSURE: 142 MMHG | WEIGHT: 179 LBS | OXYGEN SATURATION: 96 % | BODY MASS INDEX: 26.51 KG/M2 | HEART RATE: 74 BPM | RESPIRATION RATE: 12 BRPM | HEIGHT: 69 IN

## 2024-02-19 DIAGNOSIS — E78.2 MIXED HYPERLIPIDEMIA: ICD-10-CM

## 2024-02-19 DIAGNOSIS — Z00.00 ROUTINE GENERAL MEDICAL EXAMINATION AT A HEALTH CARE FACILITY: Primary | ICD-10-CM

## 2024-02-19 DIAGNOSIS — I10 BENIGN ESSENTIAL HYPERTENSION: ICD-10-CM

## 2024-02-19 DIAGNOSIS — E11.21 DIABETES MELLITUS WITH NEPHROPATHY (MULTI): ICD-10-CM

## 2024-02-19 PROCEDURE — 3077F SYST BP >= 140 MM HG: CPT | Performed by: FAMILY MEDICINE

## 2024-02-19 PROCEDURE — 1170F FXNL STATUS ASSESSED: CPT | Performed by: FAMILY MEDICINE

## 2024-02-19 PROCEDURE — 1126F AMNT PAIN NOTED NONE PRSNT: CPT | Performed by: FAMILY MEDICINE

## 2024-02-19 PROCEDURE — 1036F TOBACCO NON-USER: CPT | Performed by: FAMILY MEDICINE

## 2024-02-19 PROCEDURE — 1159F MED LIST DOCD IN RCRD: CPT | Performed by: FAMILY MEDICINE

## 2024-02-19 PROCEDURE — 1160F RVW MEDS BY RX/DR IN RCRD: CPT | Performed by: FAMILY MEDICINE

## 2024-02-19 PROCEDURE — 1123F ACP DISCUSS/DSCN MKR DOCD: CPT | Performed by: FAMILY MEDICINE

## 2024-02-19 PROCEDURE — G0439 PPPS, SUBSEQ VISIT: HCPCS | Performed by: FAMILY MEDICINE

## 2024-02-19 PROCEDURE — 3078F DIAST BP <80 MM HG: CPT | Performed by: FAMILY MEDICINE

## 2024-02-19 ASSESSMENT — PATIENT HEALTH QUESTIONNAIRE - PHQ9
SUM OF ALL RESPONSES TO PHQ QUESTIONS 1-9: 3
3. TROUBLE FALLING OR STAYING ASLEEP OR SLEEPING TOO MUCH: NOT AT ALL
9. THOUGHTS THAT YOU WOULD BE BETTER OFF DEAD, OR OF HURTING YOURSELF: NOT AT ALL
4. FEELING TIRED OR HAVING LITTLE ENERGY: NOT AT ALL
6. FEELING BAD ABOUT YOURSELF - OR THAT YOU ARE A FAILURE OR HAVE LET YOURSELF OR YOUR FAMILY DOWN: NOT AT ALL
10. IF YOU CHECKED OFF ANY PROBLEMS, HOW DIFFICULT HAVE THESE PROBLEMS MADE IT FOR YOU TO DO YOUR WORK, TAKE CARE OF THINGS AT HOME, OR GET ALONG WITH OTHER PEOPLE: SOMEWHAT DIFFICULT
1. LITTLE INTEREST OR PLEASURE IN DOING THINGS: NOT AT ALL
7. TROUBLE CONCENTRATING ON THINGS, SUCH AS READING THE NEWSPAPER OR WATCHING TELEVISION: NOT AT ALL
8. MOVING OR SPEAKING SO SLOWLY THAT OTHER PEOPLE COULD HAVE NOTICED. OR THE OPPOSITE, BEING SO FIGETY OR RESTLESS THAT YOU HAVE BEEN MOVING AROUND A LOT MORE THAN USUAL: NOT AT ALL
5. POOR APPETITE OR OVEREATING: NOT AT ALL
SUM OF ALL RESPONSES TO PHQ9 QUESTIONS 1 AND 2: 3
2. FEELING DOWN, DEPRESSED OR HOPELESS: NEARLY EVERY DAY

## 2024-02-19 ASSESSMENT — ENCOUNTER SYMPTOMS
LOSS OF SENSATION IN FEET: 0
APPETITE CHANGE: 0
DIFFICULTY URINATING: 0
FATIGUE: 0
DYSURIA: 0
ARTHRALGIAS: 0
WEAKNESS: 0
BLOOD IN STOOL: 0
DEPRESSION: 1
SORE THROAT: 0
FEVER: 0
BRUISES/BLEEDS EASILY: 0
EYE PAIN: 0
DIARRHEA: 0
ADENOPATHY: 0
ABDOMINAL PAIN: 0
CONSTIPATION: 0
DYSPHORIC MOOD: 0
CHEST TIGHTNESS: 0
EYE REDNESS: 0
ABDOMINAL DISTENTION: 0
OCCASIONAL FEELINGS OF UNSTEADINESS: 0
COUGH: 0
CHILLS: 0
DIZZINESS: 0
BACK PAIN: 0
NERVOUS/ANXIOUS: 0
HEADACHES: 0
SHORTNESS OF BREATH: 0

## 2024-02-19 ASSESSMENT — ACTIVITIES OF DAILY LIVING (ADL)
TAKING_MEDICATION: INDEPENDENT
DRESSING: INDEPENDENT
GROCERY_SHOPPING: INDEPENDENT
MANAGING_FINANCES: INDEPENDENT
BATHING: INDEPENDENT
DOING_HOUSEWORK: INDEPENDENT

## 2024-02-19 NOTE — PROGRESS NOTES
"Subjective   Reason for Visit: Jon Lowe is an 78 y.o. male here for a Medicare Wellness visit.     Past Medical, Surgical, and Family History reviewed and updated in chart.    Reviewed all medications by prescribing practitioner or clinical pharmacist (such as prescriptions, OTCs, herbal therapies and supplements) and documented in the medical record.    HPI    Patient Care Team:  Lamonte Artis MD as PCP - General  Lamonte Artis MD as PCP - [employer] PCP     Review of Systems    Objective   Vitals:  /76   Pulse 74   Resp 12   Ht 1.753 m (5' 9\")   Wt 81.2 kg (179 lb)   SpO2 96%   BMI 26.43 kg/m²       Physical Exam    Assessment/Plan   Problem List Items Addressed This Visit    None         "

## 2024-02-19 NOTE — PROGRESS NOTES
"Subjective   Patient ID: Jon Lowe is a 78 y.o. male who presents for Medicare Annual Wellness Visit Subsequent.  PMHX, PSHx, Fam hx, and Social hx reviewed.   New concerns - cat  recently, looking to get new one  Vaccines TDAP, Shingrix  Dentist seen at least yearly yes  Vision concerns none  Hearing concerns none  Diet is usually overall healthy.   Smoker - no  Alcohol use - none  Exercising 0 days per week.   Sexually active - no  Colonoscopy NA           Review of Systems   Constitutional:  Negative for appetite change, chills, fatigue and fever.   HENT:  Negative for congestion, hearing loss and sore throat.    Eyes:  Negative for pain, redness and visual disturbance.   Respiratory:  Negative for cough, chest tightness and shortness of breath.    Cardiovascular:  Negative for chest pain and leg swelling.   Gastrointestinal:  Negative for abdominal distention, abdominal pain, blood in stool, constipation and diarrhea.   Genitourinary:  Negative for difficulty urinating and dysuria.   Musculoskeletal:  Negative for arthralgias and back pain.   Skin:  Negative for rash.   Neurological:  Negative for dizziness, weakness and headaches.   Hematological:  Negative for adenopathy. Does not bruise/bleed easily.   Psychiatric/Behavioral:  Negative for dysphoric mood. The patient is not nervous/anxious.        Objective   /76   Pulse 74   Resp 12   Ht 1.753 m (5' 9\")   Wt 81.2 kg (179 lb)   SpO2 96%   BMI 26.43 kg/m²    Physical Exam  Constitutional:       General: He is not in acute distress.     Appearance: Normal appearance. He is not ill-appearing.   HENT:      Head: Normocephalic and atraumatic.      Right Ear: Tympanic membrane, ear canal and external ear normal.      Left Ear: Tympanic membrane, ear canal and external ear normal.      Nose: Nose normal.      Mouth/Throat:      Mouth: Mucous membranes are moist.      Pharynx: No oropharyngeal exudate or posterior oropharyngeal erythema. "   Eyes:      Extraocular Movements: Extraocular movements intact.      Conjunctiva/sclera: Conjunctivae normal.      Pupils: Pupils are equal, round, and reactive to light.   Neck:      Vascular: No carotid bruit.   Cardiovascular:      Rate and Rhythm: Normal rate and regular rhythm.      Heart sounds: Normal heart sounds. No murmur heard.  Pulmonary:      Breath sounds: Normal breath sounds. No wheezing, rhonchi or rales.   Abdominal:      General: Bowel sounds are normal. There is no distension.      Palpations: Abdomen is soft. There is no mass.      Tenderness: There is no abdominal tenderness.   Musculoskeletal:         General: No swelling or deformity.      Cervical back: Neck supple. No tenderness.   Lymphadenopathy:      Cervical: No cervical adenopathy.   Skin:     General: Skin is warm and dry.      Findings: No lesion or rash.   Neurological:      Mental Status: He is alert and oriented to person, place, and time.      Sensory: No sensory deficit.      Motor: No weakness.      Coordination: Coordination normal.      Deep Tendon Reflexes: Reflexes normal.   Psychiatric:         Mood and Affect: Mood normal.         Behavior: Behavior normal.         Judgment: Judgment normal.     Medicare Wellness Billing Compliance Satisfied    *This is a visual tool to show completion of required items on the day of the visit. Green checks will only appear on the date of visit.    Review all medications by prescribing practitioner or clinical pharmacist (such as prescriptions, OTCs, herbal therapies and supplements) documented in the medical record    Past Medical, Surgical, and Family History reviewed and updated in chart    Tobacco Use Reviewed    Alcohol Use Reviewed    Illicit Drug Use Reviewed    PHQ2/9    Falls in Last Year Reviewed    Home Safety Risk Factors Reviewed    Cognitive Impairment Reviewed    Patient Self Assessment and Health Status    Current Diet Reviewed    Exercise Frequency    ADL -  Hearing Impairment    ADL - Bathing    ADL - Dressing    ADL - Walks in Home    IADL - Managing Finances    IADL - Grocery Shopping    IADL - Taking Medications    IADL - Doing Housework      Diabetic foot exam:   Left:  Filament test present  Right:  Filament test present      Assessment/Plan   Diagnoses and all orders for this visit:  Routine general medical examination  - Tetanus and Shingrix recommended at pharmacy. Recheck labs before next visit. Colon screening not indicated.     Follow up in 3 months, 30mins

## 2024-04-03 DIAGNOSIS — Z98.890 H/O ENDARTERECTOMY: ICD-10-CM

## 2024-04-03 DIAGNOSIS — I65.23 BILATERAL CAROTID ARTERY STENOSIS: Primary | ICD-10-CM

## 2024-05-20 ENCOUNTER — LAB (OUTPATIENT)
Dept: LAB | Facility: LAB | Age: 79
End: 2024-05-20
Payer: MEDICARE

## 2024-05-20 DIAGNOSIS — C91.10 CHRONIC LYMPHOCYTIC LEUKEMIA (MULTI): ICD-10-CM

## 2024-05-20 DIAGNOSIS — E78.2 MIXED HYPERLIPIDEMIA: ICD-10-CM

## 2024-05-20 DIAGNOSIS — I10 BENIGN ESSENTIAL HYPERTENSION: ICD-10-CM

## 2024-05-20 DIAGNOSIS — E11.21 DIABETES MELLITUS WITH NEPHROPATHY (MULTI): ICD-10-CM

## 2024-05-20 LAB
ALBUMIN SERPL BCP-MCNC: 4.5 G/DL (ref 3.4–5)
ALP SERPL-CCNC: 98 U/L (ref 33–136)
ALT SERPL W P-5'-P-CCNC: 22 U/L (ref 10–52)
ANION GAP SERPL CALC-SCNC: 14 MMOL/L (ref 10–20)
AST SERPL W P-5'-P-CCNC: 19 U/L (ref 9–39)
BASOPHILS # BLD MANUAL: 0 X10*3/UL (ref 0–0.1)
BASOPHILS NFR BLD MANUAL: 0 %
BILIRUB SERPL-MCNC: 0.7 MG/DL (ref 0–1.2)
BUN SERPL-MCNC: 19 MG/DL (ref 6–23)
CALCIUM SERPL-MCNC: 9.1 MG/DL (ref 8.6–10.6)
CHLORIDE SERPL-SCNC: 103 MMOL/L (ref 98–107)
CHOLEST SERPL-MCNC: 144 MG/DL (ref 0–199)
CHOLESTEROL/HDL RATIO: 3.3
CO2 SERPL-SCNC: 30 MMOL/L (ref 21–32)
CREAT SERPL-MCNC: 1.32 MG/DL (ref 0.5–1.3)
EGFRCR SERPLBLD CKD-EPI 2021: 55 ML/MIN/1.73M*2
EOSINOPHIL # BLD MANUAL: 0 X10*3/UL (ref 0–0.4)
EOSINOPHIL NFR BLD MANUAL: 0 %
ERYTHROCYTE [DISTWIDTH] IN BLOOD BY AUTOMATED COUNT: 14.5 % (ref 11.5–14.5)
EST. AVERAGE GLUCOSE BLD GHB EST-MCNC: 151 MG/DL
GLUCOSE SERPL-MCNC: 102 MG/DL (ref 74–99)
HBA1C MFR BLD: 6.9 %
HCT VFR BLD AUTO: 45.2 % (ref 41–52)
HDLC SERPL-MCNC: 44 MG/DL
HGB BLD-MCNC: 14 G/DL (ref 13.5–17.5)
IMM GRANULOCYTES # BLD AUTO: 0.05 X10*3/UL (ref 0–0.5)
IMM GRANULOCYTES NFR BLD AUTO: 0.1 % (ref 0–0.9)
LDH SERPL L TO P-CCNC: 146 U/L (ref 84–246)
LDLC SERPL CALC-MCNC: 65 MG/DL
LYMPHOCYTES # BLD MANUAL: 35.99 X10*3/UL (ref 0.8–3)
LYMPHOCYTES NFR BLD MANUAL: 88 %
MCH RBC QN AUTO: 28.5 PG (ref 26–34)
MCHC RBC AUTO-ENTMCNC: 31 G/DL (ref 32–36)
MCV RBC AUTO: 92 FL (ref 80–100)
MONOCYTES # BLD MANUAL: 0.37 X10*3/UL (ref 0.05–0.8)
MONOCYTES NFR BLD MANUAL: 0.9 %
NEUTS SEG # BLD MANUAL: 3.48 X10*3/UL (ref 1.6–5)
NEUTS SEG NFR BLD MANUAL: 8.5 %
NON HDL CHOLESTEROL: 100 MG/DL (ref 0–149)
NRBC BLD-RTO: 0 /100 WBCS (ref 0–0)
PLATELET # BLD AUTO: 141 X10*3/UL (ref 150–450)
POTASSIUM SERPL-SCNC: 4.8 MMOL/L (ref 3.5–5.3)
PROT SERPL-MCNC: 6.2 G/DL (ref 6.4–8.2)
RBC # BLD AUTO: 4.91 X10*6/UL (ref 4.5–5.9)
RBC MORPH BLD: ABNORMAL
SODIUM SERPL-SCNC: 142 MMOL/L (ref 136–145)
TOTAL CELLS COUNTED BLD: 117
TRIGL SERPL-MCNC: 177 MG/DL (ref 0–149)
VARIANT LYMPHS # BLD MANUAL: 1.06 X10*3/UL (ref 0–0.3)
VARIANT LYMPHS NFR BLD: 2.6 %
VLDL: 35 MG/DL (ref 0–40)
WBC # BLD AUTO: 40.9 X10*3/UL (ref 4.4–11.3)

## 2024-05-20 PROCEDURE — 81003 URINALYSIS AUTO W/O SCOPE: CPT

## 2024-05-20 PROCEDURE — 80053 COMPREHEN METABOLIC PANEL: CPT

## 2024-05-20 PROCEDURE — 83615 LACTATE (LD) (LDH) ENZYME: CPT

## 2024-05-20 PROCEDURE — 36415 COLL VENOUS BLD VENIPUNCTURE: CPT

## 2024-05-20 PROCEDURE — 85007 BL SMEAR W/DIFF WBC COUNT: CPT

## 2024-05-20 PROCEDURE — 82043 UR ALBUMIN QUANTITATIVE: CPT

## 2024-05-20 PROCEDURE — 80061 LIPID PANEL: CPT

## 2024-05-20 PROCEDURE — 85027 COMPLETE CBC AUTOMATED: CPT

## 2024-05-20 PROCEDURE — 82570 ASSAY OF URINE CREATININE: CPT

## 2024-05-20 PROCEDURE — 83036 HEMOGLOBIN GLYCOSYLATED A1C: CPT

## 2024-05-21 LAB
APPEARANCE UR: CLEAR
BILIRUB UR STRIP.AUTO-MCNC: NEGATIVE MG/DL
COLOR UR: NORMAL
CREAT UR-MCNC: 145.5 MG/DL (ref 20–370)
GLUCOSE UR STRIP.AUTO-MCNC: NORMAL MG/DL
HOLD SPECIMEN: NORMAL
KETONES UR STRIP.AUTO-MCNC: NEGATIVE MG/DL
LEUKOCYTE ESTERASE UR QL STRIP.AUTO: NEGATIVE
MICROALBUMIN UR-MCNC: <7 MG/L
MICROALBUMIN/CREAT UR: NORMAL MG/G{CREAT}
NITRITE UR QL STRIP.AUTO: NEGATIVE
PH UR STRIP.AUTO: 5.5 [PH]
PROT UR STRIP.AUTO-MCNC: NEGATIVE MG/DL
RBC # UR STRIP.AUTO: NEGATIVE /UL
SP GR UR STRIP.AUTO: 1.02
UROBILINOGEN UR STRIP.AUTO-MCNC: NORMAL MG/DL

## 2024-05-23 ENCOUNTER — OFFICE VISIT (OUTPATIENT)
Dept: PRIMARY CARE | Facility: CLINIC | Age: 79
End: 2024-05-23
Payer: MEDICARE

## 2024-05-23 VITALS
OXYGEN SATURATION: 96 % | HEART RATE: 68 BPM | HEIGHT: 69 IN | DIASTOLIC BLOOD PRESSURE: 70 MMHG | RESPIRATION RATE: 12 BRPM | BODY MASS INDEX: 27.11 KG/M2 | WEIGHT: 183 LBS | SYSTOLIC BLOOD PRESSURE: 134 MMHG

## 2024-05-23 DIAGNOSIS — I65.23 BILATERAL CAROTID ARTERY STENOSIS: Primary | ICD-10-CM

## 2024-05-23 DIAGNOSIS — C91.10 CHRONIC LYMPHOCYTIC LEUKEMIA (MULTI): ICD-10-CM

## 2024-05-23 DIAGNOSIS — K21.9 GASTROESOPHAGEAL REFLUX DISEASE WITHOUT ESOPHAGITIS: ICD-10-CM

## 2024-05-23 DIAGNOSIS — I10 BENIGN ESSENTIAL HYPERTENSION: ICD-10-CM

## 2024-05-23 DIAGNOSIS — E78.2 MIXED HYPERLIPIDEMIA: ICD-10-CM

## 2024-05-23 DIAGNOSIS — F33.41 RECURRENT MAJOR DEPRESSIVE DISORDER, IN PARTIAL REMISSION (CMS-HCC): ICD-10-CM

## 2024-05-23 DIAGNOSIS — E03.8 SUBCLINICAL HYPOTHYROIDISM: ICD-10-CM

## 2024-05-23 DIAGNOSIS — N18.31 STAGE 3A CHRONIC KIDNEY DISEASE (MULTI): ICD-10-CM

## 2024-05-23 DIAGNOSIS — E11.21 DIABETES MELLITUS WITH NEPHROPATHY (MULTI): ICD-10-CM

## 2024-05-23 PROCEDURE — 1159F MED LIST DOCD IN RCRD: CPT | Performed by: FAMILY MEDICINE

## 2024-05-23 PROCEDURE — 99214 OFFICE O/P EST MOD 30 MIN: CPT | Performed by: FAMILY MEDICINE

## 2024-05-23 PROCEDURE — 1123F ACP DISCUSS/DSCN MKR DOCD: CPT | Performed by: FAMILY MEDICINE

## 2024-05-23 PROCEDURE — 3075F SYST BP GE 130 - 139MM HG: CPT | Performed by: FAMILY MEDICINE

## 2024-05-23 PROCEDURE — 3078F DIAST BP <80 MM HG: CPT | Performed by: FAMILY MEDICINE

## 2024-05-23 PROCEDURE — 1036F TOBACCO NON-USER: CPT | Performed by: FAMILY MEDICINE

## 2024-05-23 PROCEDURE — 1160F RVW MEDS BY RX/DR IN RCRD: CPT | Performed by: FAMILY MEDICINE

## 2024-05-23 RX ORDER — PANTOPRAZOLE SODIUM 40 MG/1
40 TABLET, DELAYED RELEASE ORAL
Qty: 90 TABLET | Refills: 1 | Status: SHIPPED | OUTPATIENT
Start: 2024-05-23

## 2024-05-23 RX ORDER — CITALOPRAM 20 MG/1
20 TABLET, FILM COATED ORAL DAILY
Qty: 90 TABLET | Refills: 1 | Status: SHIPPED | OUTPATIENT
Start: 2024-05-23

## 2024-05-23 RX ORDER — LISINOPRIL 10 MG/1
10 TABLET ORAL DAILY
Qty: 90 TABLET | Refills: 1 | Status: SHIPPED | OUTPATIENT
Start: 2024-05-23

## 2024-05-23 RX ORDER — ATORVASTATIN CALCIUM 20 MG/1
20 TABLET, FILM COATED ORAL DAILY
Qty: 90 TABLET | Refills: 1 | Status: SHIPPED | OUTPATIENT
Start: 2024-05-23

## 2024-05-23 ASSESSMENT — ENCOUNTER SYMPTOMS
BLOOD IN STOOL: 0
DIARRHEA: 0
SORE THROAT: 0
FATIGUE: 0
ABDOMINAL PAIN: 0
DIFFICULTY URINATING: 0
ABDOMINAL DISTENTION: 0
DIZZINESS: 0
EYE REDNESS: 0
DYSURIA: 0
NERVOUS/ANXIOUS: 0
APPETITE CHANGE: 0
CHEST TIGHTNESS: 0
CONSTIPATION: 0
BACK PAIN: 0
COUGH: 0
BRUISES/BLEEDS EASILY: 0
EYE PAIN: 0
DYSPHORIC MOOD: 0
CHILLS: 0
ADENOPATHY: 0
HEADACHES: 0
FEVER: 0
ARTHRALGIAS: 0
WEAKNESS: 0
SHORTNESS OF BREATH: 0

## 2024-05-23 NOTE — PROGRESS NOTES
"Subjective   Patient ID: Jon Lowe is a 79 y.o. male who presents for Follow-up.    HPI     Review of Systems    Objective   /76   Pulse 68   Resp 12   Ht 1.753 m (5' 9\")   Wt 83 kg (183 lb)   SpO2 96%   BMI 27.02 kg/m²     Physical Exam    Assessment/Plan          "

## 2024-05-23 NOTE — PROGRESS NOTES
Subjective   Patient ID: Jon Lowe is a 79 y.o. male who presents for Follow-up.  Pt is here for f/u Type 2 diabetes.   Pt is usually following low carb diet, and is exercising 4-5 days per week.  Taking no medication.   0 x daily accucheks .   A1c 6.9  Eye exam is current  Pt does not have foot pain, paresthesias, or numbness in feet. Foot checks daily - yes .  Following with podiatry -  no    Denies vision changes, abdominal pain, excessive thirst, frequent urination.     Pt has chronic HTN, hx CEA for carotid stenosis/ CVA. He has US carotids planned in June.  Pt is taking Lisinopril. Tolerating well.  Exercising 4-5 days per week   Low sodium diet is usually being followed.   Is  monitoring home blood pressures. Readings range mostly in 120s/60-70s.  Denies HA, vision changes or CP.     Pt has Dyslipidemia.   Lipid panel showed LDL 65, .  Currently taking Atorvastatin and is tolerating well without muscle pains or weakness.     Pt has stable CRI.  GFR 55, unchanged. BP is controlled and pt is following low sodium diet.     For CLL, stable and not on any tx.     Pt has chronic and stable depression / anxiety.  Pt has supportive family/friends.   Pt seeing a counselor.   Taking Celexa and it is helping.   Mood, energy, motivation, interests, sleep and appetite are adequate. Anxiety is stable.   Pt denies suicidal ideations.     GERD is well controlled on Pantoprazole . Pt is taking medication daily. Denies epigastric pain, nausea, heartburn or water brash.     Pt has chronic, subclinical  hypothyroidism.   Taking no medication  Last TSH was in good range.  Lipid panel is  well controlled.  Energy is fair. Pt denies significant weight gain, low mood, constipation, or skin changes.           Review of Systems   Constitutional:  Negative for appetite change, chills, fatigue and fever.   HENT:  Negative for congestion, hearing loss and sore throat.    Eyes:  Negative for pain, redness and visual  "disturbance.   Respiratory:  Negative for cough, chest tightness and shortness of breath.    Cardiovascular:  Negative for chest pain and leg swelling.   Gastrointestinal:  Negative for abdominal distention, abdominal pain, blood in stool, constipation and diarrhea.   Genitourinary:  Negative for difficulty urinating and dysuria.   Musculoskeletal:  Negative for arthralgias and back pain.   Skin:  Negative for rash.   Neurological:  Negative for dizziness, weakness and headaches.   Hematological:  Negative for adenopathy. Does not bruise/bleed easily.   Psychiatric/Behavioral:  Negative for dysphoric mood. The patient is not nervous/anxious.        Objective   /70   Pulse 68   Resp 12   Ht 1.753 m (5' 9\")   Wt 83 kg (183 lb)   SpO2 96%   BMI 27.02 kg/m²    Physical Exam  Constitutional:       General: He is not in acute distress.     Appearance: Normal appearance.   Cardiovascular:      Rate and Rhythm: Normal rate and regular rhythm.      Heart sounds: Normal heart sounds. No murmur heard.  Pulmonary:      Effort: Pulmonary effort is normal.      Breath sounds: Normal breath sounds.   Abdominal:      Palpations: Abdomen is soft.      Tenderness: There is no abdominal tenderness.   Neurological:      Mental Status: He is alert.   Psychiatric:         Mood and Affect: Mood normal.         Judgment: Judgment normal.           Assessment/Plan   Diagnoses and all orders for this visit:  Diabetes mellitus  - A1c higher, near uncontrolled range. Discussed working on diet/exercise.   Benign essential hypertension - well controlled with Lisinopril, continue and monitor  Bilateral carotid artery stenosis - asymptomatic, US next month  Mixed hyperlipidemia - doing well on statin, continue  Recurrent major depressive disorder - stable with Celexa, continue  Gastroesophageal reflux disease - controlled with Pantoprazole, continue lowest effective dose  Stage 3a chronic kidney disease - stable, monitoring with labs. "   Subclinical hypothyroidism - asymptomatic, monitor.  Chronic lymphocytic leukemia (Multi)     Follow up in 6months, 30min

## 2024-06-13 ENCOUNTER — HOSPITAL ENCOUNTER (OUTPATIENT)
Dept: VASCULAR MEDICINE | Facility: CLINIC | Age: 79
Discharge: HOME | End: 2024-06-13
Payer: MEDICARE

## 2024-06-13 ENCOUNTER — APPOINTMENT (OUTPATIENT)
Dept: VASCULAR MEDICINE | Facility: CLINIC | Age: 79
End: 2024-06-13
Payer: MEDICARE

## 2024-06-13 DIAGNOSIS — I65.23 BILATERAL CAROTID ARTERY STENOSIS: ICD-10-CM

## 2024-06-13 DIAGNOSIS — R09.89 OTHER SPECIFIED SYMPTOMS AND SIGNS INVOLVING THE CIRCULATORY AND RESPIRATORY SYSTEMS: ICD-10-CM

## 2024-06-13 DIAGNOSIS — Z98.890 H/O ENDARTERECTOMY: ICD-10-CM

## 2024-06-13 PROCEDURE — 93880 EXTRACRANIAL BILAT STUDY: CPT | Performed by: INTERNAL MEDICINE

## 2024-06-13 PROCEDURE — 93880 EXTRACRANIAL BILAT STUDY: CPT

## 2024-06-18 RX ORDER — GARLIC 500 MG
500 CAPSULE ORAL
COMMUNITY

## 2024-06-18 RX ORDER — AMLODIPINE BESYLATE 5 MG/1
1 TABLET ORAL DAILY
COMMUNITY

## 2024-06-18 RX ORDER — ESOMEPRAZOLE MAGNESIUM 40 MG/1
CAPSULE, DELAYED RELEASE ORAL
COMMUNITY

## 2024-06-18 RX ORDER — HYDROCHLOROTHIAZIDE 25 MG/1
TABLET ORAL
COMMUNITY

## 2024-06-18 RX ORDER — CETIRIZINE HYDROCHLORIDE 10 MG/1
TABLET ORAL
COMMUNITY

## 2024-06-20 ENCOUNTER — OFFICE VISIT (OUTPATIENT)
Dept: VASCULAR SURGERY | Facility: CLINIC | Age: 79
End: 2024-06-20
Payer: MEDICARE

## 2024-06-20 VITALS
BODY MASS INDEX: 26.78 KG/M2 | HEIGHT: 69 IN | WEIGHT: 180.8 LBS | HEART RATE: 75 BPM | SYSTOLIC BLOOD PRESSURE: 119 MMHG | DIASTOLIC BLOOD PRESSURE: 70 MMHG

## 2024-06-20 DIAGNOSIS — I65.23 BILATERAL CAROTID ARTERY STENOSIS: Primary | ICD-10-CM

## 2024-06-20 PROCEDURE — 99214 OFFICE O/P EST MOD 30 MIN: CPT | Performed by: NURSE PRACTITIONER

## 2024-06-20 PROCEDURE — 1036F TOBACCO NON-USER: CPT | Performed by: NURSE PRACTITIONER

## 2024-06-20 PROCEDURE — 1126F AMNT PAIN NOTED NONE PRSNT: CPT | Performed by: NURSE PRACTITIONER

## 2024-06-20 PROCEDURE — 3074F SYST BP LT 130 MM HG: CPT | Performed by: NURSE PRACTITIONER

## 2024-06-20 PROCEDURE — 1123F ACP DISCUSS/DSCN MKR DOCD: CPT | Performed by: NURSE PRACTITIONER

## 2024-06-20 PROCEDURE — 1159F MED LIST DOCD IN RCRD: CPT | Performed by: NURSE PRACTITIONER

## 2024-06-20 PROCEDURE — 3078F DIAST BP <80 MM HG: CPT | Performed by: NURSE PRACTITIONER

## 2024-06-20 ASSESSMENT — ENCOUNTER SYMPTOMS
SEIZURES: 0
HEMATOLOGIC/LYMPHATIC NEGATIVE: 1
HEADACHES: 0
LIGHT-HEADEDNESS: 0
DIZZINESS: 0
ALLERGIC/IMMUNOLOGIC NEGATIVE: 1
MUSCULOSKELETAL NEGATIVE: 1
UNEXPECTED WEIGHT CHANGE: 0
SPEECH DIFFICULTY: 0
PSYCHIATRIC NEGATIVE: 1
DEPRESSION: 0
OCCASIONAL FEELINGS OF UNSTEADINESS: 0
NEUROLOGICAL NEGATIVE: 1
PALPITATIONS: 0
LOSS OF SENSATION IN FEET: 0
APPETITE CHANGE: 0
EYES NEGATIVE: 1
WEAKNESS: 0
NUMBNESS: 0
GASTROINTESTINAL NEGATIVE: 1
FACIAL ASYMMETRY: 0
ENDOCRINE NEGATIVE: 1
SHORTNESS OF BREATH: 0
TREMORS: 0
CHEST TIGHTNESS: 0
ACTIVITY CHANGE: 0

## 2024-06-20 ASSESSMENT — PATIENT HEALTH QUESTIONNAIRE - PHQ9
SUM OF ALL RESPONSES TO PHQ9 QUESTIONS 1 AND 2: 0
1. LITTLE INTEREST OR PLEASURE IN DOING THINGS: NOT AT ALL
2. FEELING DOWN, DEPRESSED OR HOPELESS: NOT AT ALL

## 2024-06-20 ASSESSMENT — PAIN SCALES - GENERAL: PAINLEVEL: 0-NO PAIN

## 2024-06-20 NOTE — PATIENT INSTRUCTIONS
It was a pleasure taking care of you today and appreciate your seeing us at our [facility].     Today's plan is as follows:  1) Continue to take lipitor daily  2) Complete ultrasound of your carotids in 1 yr. Follow-up with me after ultrasound is completed    Please call the office with any questions at [phone].   You can speak to our secretaries or our clinical nurses for specific questions.   For [facility] specific questions, you can also call [phone] or email at [email or web address]  If you need coordinating your appointments and testing you can do these at the  or by calling my office shortly after your visit.

## 2024-06-20 NOTE — PROGRESS NOTES
NPV REASON: carotid artery stenosis    CURRENT ENCOUNTER:  Jon Lowe is 79 y.o. male here for follow up of routine surveillance s/p right CEA 2018 w/CVA, no notes he is off aspirin due to Chronic lymphocytic leukemia, Hagen stage II, in complete remission, status post four cycles of fludarabine and Rituxan from August 2004 through October 2004.     Denies any changes from last year    PastMedHX:  Past Medical History:   Diagnosis Date    Acute sinusitis, unspecified 11/23/2015    Acute sinusitis, recurrence not specified, unspecified location    Benign paroxysmal vertigo, unspecified ear 03/20/2019    Benign paroxysmal positional vertigo    Chronic kidney disease, stage 3 unspecified (Multi) 06/23/2020    Stage 3 chronic kidney disease    Dizziness 05/16/2023    Essential (primary) hypertension 05/22/2018    White coat syndrome with hypertension    Hyponatremia 11/21/2023    IFG (impaired fasting glucose) 05/16/2023    Impacted cerumen of right ear 05/16/2023    Mild renal insufficiency 05/16/2023    Personal history of other diseases of the circulatory system 05/10/2018    History of hypertension    Personal history of other diseases of the digestive system 03/20/2019    History of gastroesophageal reflux (GERD)    Personal history of other specified conditions 01/30/2018    History of epistaxis    Personal history of other specified conditions 04/06/2018    History of nasal congestion    Personal history of transient ischemic attack (TIA), and cerebral infarction without residual deficits 03/20/2019    History of embolic stroke    Transient ischemic attack 11/21/2023     Meds:     Current Outpatient Medications:     amLODIPine (Norvasc) 5 mg tablet, Take 1 tablet (5 mg) by mouth once daily., Disp: , Rfl:     ascorbic acid, vitamin C, 1,000 mg ER tablet, Take 1 tablet (1,000 mg) by mouth once daily., Disp: , Rfl:     atorvastatin (Lipitor) 20 mg tablet, Take 1 tablet (20 mg) by mouth once daily., Disp: 90  tablet, Rfl: 1    calcium carbonate-vitamin D3 600 mg-5 mcg (200 unit) tablet, Take by mouth., Disp: , Rfl:     cetirizine (ZyrTEC) 10 mg tablet, Take by mouth., Disp: , Rfl:     cholecalciferol (Vitamin D-3) 50 mcg (2,000 unit) capsule, Take 1 capsule (50 mcg) by mouth once daily., Disp: , Rfl:     citalopram (CeleXA) 20 mg tablet, Take 1 tablet (20 mg) by mouth once daily., Disp: 90 tablet, Rfl: 1    esomeprazole (NexIUM) 40 mg DR capsule, Take by mouth., Disp: , Rfl:     garlic 500 mg capsule, Take 500 mg by mouth., Disp: , Rfl:     hydroCHLOROthiazide (HYDRODiuril) 25 mg tablet, Take by mouth., Disp: , Rfl:     ketotifen fumarate (ZADITOR OPHT), Administer into affected eye(s) twice a day., Disp: , Rfl:     lisinopril 10 mg tablet, Take 1 tablet (10 mg) by mouth once daily. for blood pressure, Disp: 90 tablet, Rfl: 1    magnesium oxide 400 mg magnesium capsule, Take 1 capsule (400 mg) by mouth once daily., Disp: , Rfl:     pantoprazole (ProtoNix) 40 mg EC tablet, Take 1 tablet (40 mg) by mouth once daily in the morning. Take before meals. Do not crush, chew, or split., Disp: 90 tablet, Rfl: 1    sodium chloride (Lily 128) 5 % ophthalmic solution, Administer into affected eye(s)., Disp: , Rfl:     triamcinolone (Kenalog) 0.1 % cream, 1 Application, Disp: , Rfl:     Allergies:   Allergies   Allergen Reactions    Ultracet [Tramadol-Acetaminophen] Nausea/vomiting    Hydralazine Hives    Levofloxacin Unknown     ROS:  Review of Systems   Constitutional:  Negative for activity change, appetite change and unexpected weight change.   HENT: Negative.     Eyes: Negative.    Respiratory:  Negative for chest tightness and shortness of breath.    Cardiovascular:  Negative for chest pain and palpitations.   Gastrointestinal: Negative.    Endocrine: Negative.    Genitourinary: Negative.    Musculoskeletal: Negative.    Skin: Negative.    Allergic/Immunologic: Negative.    Neurological: Negative.  Negative for dizziness,  "tremors, seizures, syncope, facial asymmetry, speech difficulty, weakness, light-headedness, numbness and headaches.   Hematological: Negative.    Psychiatric/Behavioral: Negative.        Objective:  Vitals:  Vitals:    06/20/24 1514   BP: 119/70   Pulse:    /70 (BP Location: Right arm)   Pulse 75   Ht 1.753 m (5' 9\")   Wt 82 kg (180 lb 12.8 oz)   BMI 26.70 kg/m²      Physical Exam  HENT:      Head: Normocephalic and atraumatic.      Nose: Nose normal.      Mouth/Throat:      Mouth: Mucous membranes are moist.   Eyes:      Conjunctiva/sclera: Conjunctivae normal.   Cardiovascular:      Rate and Rhythm: Normal rate.      Pulses: Normal pulses.   Pulmonary:      Effort: Pulmonary effort is normal.   Musculoskeletal:         General: Normal range of motion.      Cervical back: Normal range of motion.   Skin:     General: Skin is warm and dry.      Capillary Refill: Capillary refill takes less than 2 seconds.   Neurological:      General: No focal deficit present.      Mental Status: He is alert and oriented to person, place, and time.   Psychiatric:         Mood and Affect: Mood normal.         Behavior: Behavior normal.         Thought Content: Thought content normal.         Judgment: Judgment normal.     Labs:  Lab Results   Component Value Date    WBC 40.9 (H) 05/20/2024    WBC 37.2 (H) 02/08/2024    WBC 34.0 (H) 06/29/2023    WBC CANCELED 06/29/2023    HGB 14.0 05/20/2024    HGB 14.2 02/08/2024    HGB 14.8 06/29/2023    HGB CANCELED 06/29/2023    HCT 45.2 05/20/2024    HCT 43.0 02/08/2024    HCT 44.2 06/29/2023    HCT CANCELED 06/29/2023    MCV 92 05/20/2024    MCV 89 02/08/2024    MCV 88 06/29/2023    MCV CANCELED 06/29/2023     (L) 05/20/2024     Lab Results   Component Value Date    CREATININE 1.32 (H) 05/20/2024    CREATININE 1.31 (H) 02/08/2024    CREATININE 1.46 (H) 11/20/2023    BUN 19 05/20/2024    BUN 19 02/08/2024    BUN 27 (H) 11/20/2023     05/20/2024     02/08/2024    NA " 140 2023    K 4.8 2024    K 4.6 2024    K 4.9 2023     2024     2024     2023    CO2 30 2024    CO2 30 2024    CO2 29 2023       Imagin24 Carotid artery duplex, bilateral  CONCLUSIONS:  Right Carotid: Findings are consistent with less than 50% stenosis of the right proximal internal carotid artery. Right external carotid artery appears patent with no evidence of stenosis. No evidence of hemodynamically significant stenosis of the right common carotid artery. The right vertebral artery is patent with antegrade flow. No evidence of hemodynamically significant stenosis in the right subclavian artery.  Left Carotid: Findings are consistent with less than 50% stenosis of the left proximal internal carotid artery. Left external carotid artery appears patent with no evidence of stenosis. No evidence of hemodynamically significant stenosis of the left common carotid artery. The left vertebral artery is patent with antegrade flow. No evidence of hemodynamically significant stenosis in the left subclavian artery.     Comparison:  Compared with study from 2023, no significant change.    Assessment & Plan:  Jon Lowe is 79 y.o. male here for follow up of routine surveillance s/p right CEA 2018 w/CVA,  to note he is off aspirin due to Chronic lymphocytic leukemia, Hagen stage II, in complete remission, status post four cycles of fludarabine and Rituxan from 2004 through 2004.     Patient was educated on the signs and symptoms of amaurosis fugax, TIA or stroke and told to call 911 or come to the emergency room if these develop.     It was a pleasure taking care of you today and appreciate your seeing us at our [facility].     Today's plan is as follows:  1) Continue to take lipitor daily  2) Complete ultrasound of your carotids in 1 yr. Follow-up with me after  ultrasound is completed    María Parra, DANIELLE, APRN-CNP, AGPCNP-C, FNP-C, AGACNP-BC  Senior Nurse Practitioner, Vascular Surgery  [facility], [facility] [place]  [address] by [facility], Office [phone]

## 2024-06-27 ENCOUNTER — APPOINTMENT (OUTPATIENT)
Dept: VASCULAR SURGERY | Facility: CLINIC | Age: 79
End: 2024-06-27
Payer: MEDICARE

## 2024-08-08 ENCOUNTER — APPOINTMENT (OUTPATIENT)
Dept: HEMATOLOGY/ONCOLOGY | Facility: HOSPITAL | Age: 79
End: 2024-08-08
Payer: MEDICARE

## 2024-08-11 ASSESSMENT — ENCOUNTER SYMPTOMS
DIAPHORESIS: 0
ADENOPATHY: 1
UNEXPECTED WEIGHT CHANGE: 0
APPETITE CHANGE: 0
CHILLS: 0
FEVER: 0

## 2024-08-11 NOTE — PROGRESS NOTES
Patient ID: Jon Lowe is a 79 y.o. male.    Treatment:   Oncology History Overview Note   Cancer History:          Leukemia - Chronic Lymphocytic         AJCC Edition: 7th, Diagnosis Date: 07-Oct-1993, Stage 1,         Treatment Synopsis:    1. .Hagen stage II CLL in complete remission after four cycles of fludarabine and Rituxan, completing in October 2004.  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  2 Stroke in January 2018 with loss of feeling in left arm and has residual numbness on left hip.  He has some mild residual word finding when he gets tired.  He had a right  carotid endarterectomy 1/24/18 at [facility] and he was on plavix temporarily.         Treatment History:    1. Chronic lymphocytic leukemia, Hagen stage II, diagnosed in 1994, and developed lymphadenopathy in May of 2004, and then underwent a bone marrow biopsy  examination that revealed 80% involvement with chronic lymphocytic leukemia. The patient is CD30 negative and ZAP-70 positive. The patient received treatment with FND plus Rituxan therapy on June 8, 2004, which was complicated by nausea, vomiting, and  gastrointestinal bleeding. This therapy was changed to fludarabine and Rituxan, and he received a total of four cycles from August 2, 2004, through October 11, 2004. Repeat bone marrow biopsy examination on August 2, 2004, demonstrated no evidence of  tumorous involvement either by pathologic review or flow cytometric analysis. The patient's chemotherapy course, however, was complicated by a prolonged pancytopenia.       Chronic lymphocytic leukemia (Multi)   5/16/2023 Initial Diagnosis    Chronic lymphocytic leukemia (CMS/HCC)       Response:     Past Medical History:  Herpes zoster involving the T6 and T8 dermatomes in April 2006, then re-involvement of the left shoulder and arm in 2006, Hypertension, History of ichthyosis vulgaris since infancy, evaluated by Dr. Dawson Smith of Dermatology, Status post multiple nevi,  Osteopenia, Seasonal allergies, History of gastroesophageal reflux disease, Stroke in January 2018 with loss of feeling in left arm and has residual numbness on left hip, He has some mild residual word finding when he gets tired.  He had a right carotid endarterectomy 1/24/18 at [facility].    Family History:  No family history on file.    Social History:  Wife passes away about 11 years ago.  Lives alone.  Son lives in [place].  -------------------------------------------------------------------------------------------------------  Subjective       HPI    Jon Lowe is a 79 year old male with CLL, s/p treatment with fludarabine and rituxan for 4 cycles completed in 2004.  Jon presents to the clinic today (8/12/24) unaccompanied for follow up for his CLL and blood work.  Got two rescue cats, states his prior cat passed away in that he had after his wife passed away 11 years ago.  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  No recent illnesses, ED visits, or hospitalizations.  Energy level is okay.  Appetite is good, gained a few pounds.  Can intermittently feel lymph nodes in neck but there is no enlargement or discomfort to lymph nodes.  He has no other health complaints to report today.      Review of Systems   Constitutional:  Negative for appetite change, chills, diaphoresis, fatigue, fever and unexpected weight change.   Cardiovascular: Negative.    Gastrointestinal: Negative.    Genitourinary: Negative.     Musculoskeletal: Negative.    Skin: Negative.    Neurological: Negative.    Hematological:  Positive for adenopathy (can feel in the neck).   -------------------------------------------------------------------------------------------------------  Objective   BSA: 2.02 meters squared  /77   Pulse 73   Temp 36.3 °C (97.3 °F)   Resp 16   Wt 83.9 kg (184 lb 15.5 oz)   SpO2 93%   BMI 27.31 kg/m²     Physical Exam  Vitals reviewed.    Constitutional:       Appearance: Normal appearance.   HENT:      Head: Normocephalic.   Eyes:      General: No scleral icterus.     Pupils: Pupils are equal, round, and reactive to light.   Cardiovascular:      Rate and Rhythm: Normal rate and regular rhythm.      Pulses: Normal pulses.      Heart sounds: Normal heart sounds.   Pulmonary:      Effort: Pulmonary effort is normal.      Breath sounds: Normal breath sounds.   Abdominal:      General: Abdomen is flat. Bowel sounds are normal.      Palpations: Abdomen is soft. There is no mass.      Tenderness: There is no abdominal tenderness.   Musculoskeletal:         General: No swelling. Normal range of motion.   Lymphadenopathy:      Comments: Small palpable, non-tender lymph nodes to bilateral cervical region, stable from prior visit   Skin:     General: Skin is warm and dry.   Neurological:      General: No focal deficit present.      Mental Status: He is alert and oriented to person, place, and time.   Psychiatric:         Mood and Affect: Mood normal.         Behavior: Behavior normal.     Performance Status:  ECOG Performance Status: 1 restricted from physically strenuous work  -------------------------------------------------------------------------------------------------------  Assessment/Plan      1. Chronic lymphocytic leukemia, Hagen stage II, in complete remission, status post four  cycles of fludarabine and Rituxan from August 2004 through October 2004. The patient continues to have relatively stable blood counts without  evidence of white blood cell count doubling in six months. Stable hgb. No recurrent infections. Additionally, no  splenomegaly on examination & stable, palpable cervical lymphadenopathy.     CT neck (1/21/18) - numerous cervical lymph nodes. For example,  there are submandibular lymph nodes measuring up to 1.8 cm in greatest axial dimension on the right. There is a right level 3 lymph node measuring approximately 2.4 cm in greatest  axial dimension. Its posterior margin is somewhat indistinct. There are  also numerous supraclavicular, axillary, and posterior pectoralis lymph nodes.    Recent WBC/lymphocyte count (6/29/23) are relatively stable (34/29) compared to previous values : 31/26 on 2/9/23, 28.3/22.67 on 5/16/22, 29.1/23.51 on 11/1/21,  26.8/22 on 5/3/21, 20.6/15.92,on 11/2/20,  24.2/17.89 on 5/4/20, 21.8/15.91 on 11/4/19, &  25.2/19.4 on 11/8/18).    He has preserved hgb (14.8) & stable mild thrombocytopenia (125k, 137k 180k, 140k, 128k, 124k, & 121k).  No indication for treatment at this time.    8/12/24:  Slight increase to WBC to 43.3 from 40.9 (5/20/24).   Stable hgb 14.4, and plt 125.  Today continues to have small stable palpable cervical lymph nodes.  No other concerning findings on examination.  Continue to follow up in 6 months.  Advised to contact with any concerning symptoms.      WBC   Date Value Ref Range Status   08/12/2024 43.3 (H) 4.4 - 11.3 x10*3/uL Final   05/20/2024 40.9 (H) 4.4 - 11.3 x10*3/uL Final   02/08/2024 37.2 (H) 4.4 - 11.3 x10*3/uL Final   06/29/2023 34.0 (H) 4.4 - 11.3 x10E9/L Final   06/29/2023 CANCELED       Comment:     Result canceled by the ancillary.   02/09/2023 30.9 (H) 4.4 - 11.3 x10E9/L Final   05/16/2022 28.3 (H) 4.4 - 11.3 x10E9/L Final   01/15/2022 23.0 (H) 4.4 - 11.3 x10E9/L Final     Hemoglobin   Date Value Ref Range Status   08/12/2024 14.4 13.5 - 17.5 g/dL Final   05/20/2024 14.0 13.5 - 17.5 g/dL Final   02/08/2024 14.2 13.5 - 17.5 g/dL Final     Platelets   Date Value Ref Range Status   08/12/2024 125 (L) 150 - 450 x10*3/uL Final   05/20/2024 141 (L) 150 - 450 x10*3/uL Final   02/08/2024 126 (L) 150 - 450 x10*3/uL Final     Annual carotid artery duplex ultrasound (6/19/23) - Multiple enlarged lymph nodes visualized bilaterally within the neck. The largest measurment on the right side is 9.60 mm x 31.26 mm. The largest measurement on the left side is 12.20 mm x 21.69 mm.  Last performed  6/13/24, with no mention of lymph nodes.      2. Hypertensive - despite current BP regimen, management of recent stroke with bp control and aspirin.  Instructed patient to monitor &  log BP at home, then follow up with his PCP.    8/12/24:  /77, currently on Lisinopril 10 mg daily.    3. Vaccines:   Pneumovax (10/06/14). Patient will have seasonal influenza vaccination provided by his PCP. Needs pneumovax updated.  Advised to get shingrix vaccine again.  He recv'd his first dose of covid vaccination (Moderna) in April 2021, but has not recv'd his second dose yet.   8/12/24:  Has not received updated vaccinations, advised to receive shingrix, pneumonia, RSV, updated covid vaccines at local pharmacy.    4. Dermatology: diffuse actinic keratoses. Follows with Dermatology and his moles were evaluated. He will continue with a yearly full body evaluation.  Using Acyclovir as needed since recent zoster outbreak in May 2019.    History of Stroke 2018, Bilateral carotid Artery Stenosis:  - Following with vascular surgery, obtains yearly carotid duplex, last done June 2024, as above.    -vascular surgeon advised to stop aspirin r/t GI issues    RTC:  Follow up visit with provider in 6 months with blood work prior.  Advised to call with any new concerns/issues.    -------------------------------------------------------------------------------------------------------  TANK Lowery-PATRICK

## 2024-08-12 ENCOUNTER — LAB (OUTPATIENT)
Dept: LAB | Facility: HOSPITAL | Age: 79
End: 2024-08-12
Payer: MEDICARE

## 2024-08-12 ENCOUNTER — OFFICE VISIT (OUTPATIENT)
Dept: HEMATOLOGY/ONCOLOGY | Facility: HOSPITAL | Age: 79
End: 2024-08-12
Payer: MEDICARE

## 2024-08-12 VITALS
BODY MASS INDEX: 27.31 KG/M2 | DIASTOLIC BLOOD PRESSURE: 77 MMHG | OXYGEN SATURATION: 93 % | TEMPERATURE: 97.3 F | SYSTOLIC BLOOD PRESSURE: 144 MMHG | RESPIRATION RATE: 16 BRPM | HEART RATE: 73 BPM | WEIGHT: 184.97 LBS

## 2024-08-12 DIAGNOSIS — C91.10 CHRONIC LYMPHOCYTIC LEUKEMIA (MULTI): Primary | ICD-10-CM

## 2024-08-12 DIAGNOSIS — E78.2 MIXED HYPERLIPIDEMIA: ICD-10-CM

## 2024-08-12 DIAGNOSIS — I65.23 BILATERAL CAROTID ARTERY STENOSIS: ICD-10-CM

## 2024-08-12 DIAGNOSIS — E11.21 DIABETES MELLITUS WITH NEPHROPATHY (MULTI): ICD-10-CM

## 2024-08-12 DIAGNOSIS — I10 BENIGN ESSENTIAL HYPERTENSION: ICD-10-CM

## 2024-08-12 DIAGNOSIS — C91.10 CHRONIC LYMPHOCYTIC LEUKEMIA (MULTI): ICD-10-CM

## 2024-08-12 DIAGNOSIS — Z86.73 HISTORY OF EMBOLIC STROKE: ICD-10-CM

## 2024-08-12 LAB
ALBUMIN SERPL BCP-MCNC: 4.9 G/DL (ref 3.4–5)
ALP SERPL-CCNC: 91 U/L (ref 33–136)
ALT SERPL W P-5'-P-CCNC: 20 U/L (ref 10–52)
ANION GAP SERPL CALC-SCNC: 15 MMOL/L (ref 10–20)
AST SERPL W P-5'-P-CCNC: 19 U/L (ref 9–39)
BASOPHILS # BLD AUTO: 0.14 X10*3/UL (ref 0–0.1)
BASOPHILS NFR BLD AUTO: 0.3 %
BILIRUB SERPL-MCNC: 0.9 MG/DL (ref 0–1.2)
BUN SERPL-MCNC: 19 MG/DL (ref 6–23)
CALCIUM SERPL-MCNC: 9.1 MG/DL (ref 8.6–10.3)
CHLORIDE SERPL-SCNC: 104 MMOL/L (ref 98–107)
CO2 SERPL-SCNC: 28 MMOL/L (ref 21–32)
CREAT SERPL-MCNC: 1.45 MG/DL (ref 0.5–1.3)
DACRYOCYTES BLD QL SMEAR: NORMAL
EGFRCR SERPLBLD CKD-EPI 2021: 49 ML/MIN/1.73M*2
EOSINOPHIL # BLD AUTO: 0.15 X10*3/UL (ref 0–0.4)
EOSINOPHIL NFR BLD AUTO: 0.3 %
ERYTHROCYTE [DISTWIDTH] IN BLOOD BY AUTOMATED COUNT: 14.9 % (ref 11.5–14.5)
GLUCOSE SERPL-MCNC: 97 MG/DL (ref 74–99)
HCT VFR BLD AUTO: 43.7 % (ref 41–52)
HGB BLD-MCNC: 14.4 G/DL (ref 13.5–17.5)
IMM GRANULOCYTES # BLD AUTO: 0.09 X10*3/UL (ref 0–0.5)
IMM GRANULOCYTES NFR BLD AUTO: 0.2 % (ref 0–0.9)
LDH SERPL L TO P-CCNC: 158 U/L (ref 84–246)
LYMPHOCYTES # BLD AUTO: 38.61 X10*3/UL (ref 0.8–3)
LYMPHOCYTES NFR BLD AUTO: 89.2 %
MCH RBC QN AUTO: 29.2 PG (ref 26–34)
MCHC RBC AUTO-ENTMCNC: 33 G/DL (ref 32–36)
MCV RBC AUTO: 89 FL (ref 80–100)
MONOCYTES # BLD AUTO: 1.42 X10*3/UL (ref 0.05–0.8)
MONOCYTES NFR BLD AUTO: 3.3 %
NEUTROPHILS # BLD AUTO: 2.88 X10*3/UL (ref 1.6–5.5)
NEUTROPHILS NFR BLD AUTO: 6.7 %
NRBC BLD-RTO: 0 /100 WBCS (ref 0–0)
OVALOCYTES BLD QL SMEAR: NORMAL
PLATELET # BLD AUTO: 125 X10*3/UL (ref 150–450)
POLYCHROMASIA BLD QL SMEAR: NORMAL
POTASSIUM SERPL-SCNC: 5.3 MMOL/L (ref 3.5–5.3)
PROT SERPL-MCNC: 6.6 G/DL (ref 6.4–8.2)
RBC # BLD AUTO: 4.93 X10*6/UL (ref 4.5–5.9)
RBC MORPH BLD: NORMAL
SODIUM SERPL-SCNC: 142 MMOL/L (ref 136–145)
WBC # BLD AUTO: 43.3 X10*3/UL (ref 4.4–11.3)

## 2024-08-12 PROCEDURE — 99214 OFFICE O/P EST MOD 30 MIN: CPT

## 2024-08-12 PROCEDURE — 83615 LACTATE (LD) (LDH) ENZYME: CPT

## 2024-08-12 PROCEDURE — 1159F MED LIST DOCD IN RCRD: CPT

## 2024-08-12 PROCEDURE — 1126F AMNT PAIN NOTED NONE PRSNT: CPT

## 2024-08-12 PROCEDURE — 3077F SYST BP >= 140 MM HG: CPT

## 2024-08-12 PROCEDURE — 1036F TOBACCO NON-USER: CPT

## 2024-08-12 PROCEDURE — 80053 COMPREHEN METABOLIC PANEL: CPT

## 2024-08-12 PROCEDURE — 1160F RVW MEDS BY RX/DR IN RCRD: CPT

## 2024-08-12 PROCEDURE — 3078F DIAST BP <80 MM HG: CPT

## 2024-08-12 PROCEDURE — 1123F ACP DISCUSS/DSCN MKR DOCD: CPT

## 2024-08-12 PROCEDURE — 85025 COMPLETE CBC W/AUTO DIFF WBC: CPT

## 2024-08-12 PROCEDURE — 36415 COLL VENOUS BLD VENIPUNCTURE: CPT

## 2024-08-12 ASSESSMENT — ENCOUNTER SYMPTOMS
FATIGUE: 0
NEUROLOGICAL NEGATIVE: 1
GASTROINTESTINAL NEGATIVE: 1
CARDIOVASCULAR NEGATIVE: 1
MUSCULOSKELETAL NEGATIVE: 1

## 2024-08-12 ASSESSMENT — PAIN SCALES - GENERAL: PAINLEVEL: 0-NO PAIN

## 2024-08-13 LAB — HOLD SPECIMEN: NORMAL

## 2024-11-26 ENCOUNTER — APPOINTMENT (OUTPATIENT)
Dept: PRIMARY CARE | Facility: CLINIC | Age: 79
End: 2024-11-26
Payer: MEDICARE

## 2024-11-26 VITALS
DIASTOLIC BLOOD PRESSURE: 84 MMHG | HEART RATE: 84 BPM | OXYGEN SATURATION: 95 % | HEIGHT: 69 IN | RESPIRATION RATE: 12 BRPM | SYSTOLIC BLOOD PRESSURE: 132 MMHG | BODY MASS INDEX: 27.11 KG/M2 | WEIGHT: 183 LBS

## 2024-11-26 DIAGNOSIS — E03.8 SUBCLINICAL HYPOTHYROIDISM: ICD-10-CM

## 2024-11-26 DIAGNOSIS — E78.2 MIXED HYPERLIPIDEMIA: ICD-10-CM

## 2024-11-26 DIAGNOSIS — I65.23 BILATERAL CAROTID ARTERY STENOSIS: ICD-10-CM

## 2024-11-26 DIAGNOSIS — N18.31 STAGE 3A CHRONIC KIDNEY DISEASE (MULTI): ICD-10-CM

## 2024-11-26 DIAGNOSIS — I10 BENIGN ESSENTIAL HYPERTENSION: ICD-10-CM

## 2024-11-26 DIAGNOSIS — F33.41 RECURRENT MAJOR DEPRESSIVE DISORDER, IN PARTIAL REMISSION (CMS-HCC): ICD-10-CM

## 2024-11-26 DIAGNOSIS — C91.10 CHRONIC LYMPHOCYTIC LEUKEMIA (MULTI): ICD-10-CM

## 2024-11-26 DIAGNOSIS — Z00.00 HEALTHCARE MAINTENANCE: ICD-10-CM

## 2024-11-26 DIAGNOSIS — Z12.5 SCREENING PSA (PROSTATE SPECIFIC ANTIGEN): ICD-10-CM

## 2024-11-26 DIAGNOSIS — K21.9 GASTROESOPHAGEAL REFLUX DISEASE WITHOUT ESOPHAGITIS: ICD-10-CM

## 2024-11-26 DIAGNOSIS — E11.21 DIABETES MELLITUS WITH NEPHROPATHY (MULTI): Primary | ICD-10-CM

## 2024-11-26 DIAGNOSIS — Z86.73 HISTORY OF EMBOLIC STROKE: ICD-10-CM

## 2024-11-26 PROBLEM — I16.0 HYPERTENSIVE URGENCY: Status: RESOLVED | Noted: 2023-11-21 | Resolved: 2024-11-26

## 2024-11-26 PROCEDURE — 99214 OFFICE O/P EST MOD 30 MIN: CPT | Performed by: FAMILY MEDICINE

## 2024-11-26 PROCEDURE — 1123F ACP DISCUSS/DSCN MKR DOCD: CPT | Performed by: FAMILY MEDICINE

## 2024-11-26 PROCEDURE — G2211 COMPLEX E/M VISIT ADD ON: HCPCS | Performed by: FAMILY MEDICINE

## 2024-11-26 PROCEDURE — 3075F SYST BP GE 130 - 139MM HG: CPT | Performed by: FAMILY MEDICINE

## 2024-11-26 PROCEDURE — 1036F TOBACCO NON-USER: CPT | Performed by: FAMILY MEDICINE

## 2024-11-26 PROCEDURE — 1160F RVW MEDS BY RX/DR IN RCRD: CPT | Performed by: FAMILY MEDICINE

## 2024-11-26 PROCEDURE — 1159F MED LIST DOCD IN RCRD: CPT | Performed by: FAMILY MEDICINE

## 2024-11-26 PROCEDURE — 3079F DIAST BP 80-89 MM HG: CPT | Performed by: FAMILY MEDICINE

## 2024-11-26 RX ORDER — LISINOPRIL 10 MG/1
10 TABLET ORAL DAILY
Qty: 90 TABLET | Refills: 1 | Status: SHIPPED | OUTPATIENT
Start: 2024-11-26

## 2024-11-26 RX ORDER — ATORVASTATIN CALCIUM 20 MG/1
20 TABLET, FILM COATED ORAL DAILY
Qty: 90 TABLET | Refills: 1 | Status: SHIPPED | OUTPATIENT
Start: 2024-11-26

## 2024-11-26 RX ORDER — PANTOPRAZOLE SODIUM 40 MG/1
40 TABLET, DELAYED RELEASE ORAL
Qty: 90 TABLET | Refills: 1 | Status: SHIPPED | OUTPATIENT
Start: 2024-11-26

## 2024-11-26 RX ORDER — CITALOPRAM 20 MG/1
20 TABLET, FILM COATED ORAL DAILY
Qty: 90 TABLET | Refills: 1 | Status: SHIPPED | OUTPATIENT
Start: 2024-11-26

## 2024-11-26 ASSESSMENT — ENCOUNTER SYMPTOMS
HEADACHES: 0
FATIGUE: 0
BLOOD IN STOOL: 0
EYE PAIN: 0
CHEST TIGHTNESS: 0
DIFFICULTY URINATING: 0
FEVER: 0
BACK PAIN: 0
DYSPHORIC MOOD: 0
NERVOUS/ANXIOUS: 0
CONSTIPATION: 0
DYSURIA: 0
ABDOMINAL PAIN: 0
SHORTNESS OF BREATH: 0
SORE THROAT: 0
EYE REDNESS: 0
ABDOMINAL DISTENTION: 0
WEAKNESS: 0
BRUISES/BLEEDS EASILY: 0
APPETITE CHANGE: 0
ADENOPATHY: 0
DIARRHEA: 0
CHILLS: 0
DIZZINESS: 0
ARTHRALGIAS: 1
COUGH: 0

## 2024-11-26 NOTE — PROGRESS NOTES
Subjective   Patient ID: Jon Lowe is a 79 y.o. male who presents for Follow-up.  Pt is here for f/u Type 2 diabetes.   Pt is usually following low carb diet, and is exercising 1-2 days per week.  Taking no medication.   0 x daily accucheks .   A1c 6.9 in May  Eye exam is current  Pt does not  have foot pain, paresthesias, or numbness in feet. Foot checks daily - yes    .  Following with podiatry -  no   Denies vision changes, abdominal pain, excessive thirst, frequent urination.     Pt has chronic HTN, Hx CVA, hx CEA.  Pt is taking Lisinopril. Tolerating well.  Exercising 1-2 days per week   Low sodium diet is usually being followed.  Is not monitoring home blood pressures.  Denies HA, vision changes or CP.     Pt has Dyslipidemia.   Lipid panel showed LDL 65 in May.  Currently taking Atorvastatin and is tolerating well without muscle pains or weakness.     Pt has stable CRI.  GFR 49 in August, within his baseline range. BP is controlled and pt is  following low sodium diet.     Pt has chronic and stable depression / anxiety.  Pt  has supportive family/friends.   Pt not seeing a counselor.   Taking Celexa and it is helping.   Mood, energy, motivation, interests, sleep and appetite are adequate. Anxiety is stable  Pt denies suicidal ideations.     GERD is well controlled on Pantoprazole . Pt is taking medication daily. Denies epigastric pain, nausea, heartburn or water brash.     CLL is stable,monitoring with Hematology.           Review of Systems   Constitutional:  Negative for appetite change, chills, fatigue and fever.   HENT:  Negative for congestion, hearing loss and sore throat.    Eyes:  Negative for pain, redness and visual disturbance.   Respiratory:  Negative for cough, chest tightness and shortness of breath.    Cardiovascular:  Negative for chest pain and leg swelling.   Gastrointestinal:  Negative for abdominal distention, abdominal pain, blood in stool, constipation and diarrhea.  "  Genitourinary:  Negative for difficulty urinating and dysuria.   Musculoskeletal:  Positive for arthralgias. Negative for back pain.   Skin:  Negative for rash.   Neurological:  Negative for dizziness, weakness and headaches.   Hematological:  Negative for adenopathy. Does not bruise/bleed easily.   Psychiatric/Behavioral:  Negative for dysphoric mood. The patient is not nervous/anxious.        Objective   /84   Pulse 84   Resp 12   Ht 1.753 m (5' 9\")   Wt 83 kg (183 lb)   SpO2 95%   BMI 27.02 kg/m²    Physical Exam  Constitutional:       General: He is not in acute distress.     Appearance: Normal appearance. He is not ill-appearing.   HENT:      Right Ear: There is impacted cerumen.      Left Ear: There is impacted cerumen.      Nose: Nose normal.      Mouth/Throat:      Mouth: Mucous membranes are moist.      Pharynx: No oropharyngeal exudate or posterior oropharyngeal erythema.   Eyes:      Extraocular Movements: Extraocular movements intact.      Conjunctiva/sclera: Conjunctivae normal.      Pupils: Pupils are equal, round, and reactive to light.   Neck:      Thyroid: No thyroid mass or thyromegaly.   Cardiovascular:      Rate and Rhythm: Normal rate and regular rhythm.      Heart sounds: Normal heart sounds. No murmur heard.  Pulmonary:      Breath sounds: Normal breath sounds. No wheezing, rhonchi or rales.   Abdominal:      Palpations: Abdomen is soft.      Tenderness: There is no abdominal tenderness.   Lymphadenopathy:      Cervical: No cervical adenopathy.   Neurological:      Mental Status: He is alert and oriented to person, place, and time.      Sensory: No sensory deficit.      Motor: No weakness.      Coordination: Coordination normal.      Deep Tendon Reflexes: Reflexes normal.   Psychiatric:         Mood and Affect: Mood normal.         Behavior: Behavior normal.         Judgment: Judgment normal.           Assessment/Plan   Diagnoses and all orders for this visit:  Diabetes mellitus " with nephropathy  - stable on recent labs, continue to monitor and work on low carb/sugar diet.  Benign essential hypertension- well controlled on Lisinopril, conitnue  Mixed hyperlipidemia- well controlled on statin, continue  Bilateral carotid artery stenosis/Hx CVA - asymptomatic,monitor  Stage 3a chronic kidney disease - stable, continue to monitor with labs  Chronic lymphocytic leukemia  - stable, continue monitoring with Hematology  Recurrent major depressive disorder- well controlled on Celexa  Gastroesophageal reflux disease -stable on Pantoprazole, continue lowest effective dose  Subclinical hypothyroidism - asymptomatic, will monitor with labs.     Follow up in 3 months, 30min for preventative

## 2024-11-26 NOTE — PROGRESS NOTES
"Subjective   Patient ID: Jon Lowe is a 79 y.o. male who presents for Follow-up.    HPI     Review of Systems    Objective   /84   Pulse 84   Resp 12   Ht 1.753 m (5' 9\")   Wt 83 kg (183 lb)   SpO2 95%   BMI 27.02 kg/m²     Physical Exam    Assessment/Plan          "

## 2025-02-10 ENCOUNTER — APPOINTMENT (OUTPATIENT)
Dept: HEMATOLOGY/ONCOLOGY | Facility: HOSPITAL | Age: 80
End: 2025-02-10
Payer: MEDICARE

## 2025-02-21 ENCOUNTER — APPOINTMENT (OUTPATIENT)
Dept: PRIMARY CARE | Facility: CLINIC | Age: 80
End: 2025-02-21
Payer: MEDICARE

## 2025-04-07 ENCOUNTER — APPOINTMENT (OUTPATIENT)
Dept: PRIMARY CARE | Facility: CLINIC | Age: 80
End: 2025-04-07
Payer: MEDICARE

## 2025-04-13 ASSESSMENT — ENCOUNTER SYMPTOMS
GASTROINTESTINAL NEGATIVE: 1
APPETITE CHANGE: 0
FEVER: 0
DIAPHORESIS: 0
UNEXPECTED WEIGHT CHANGE: 0
CHILLS: 0
NEUROLOGICAL NEGATIVE: 1
ADENOPATHY: 1
CARDIOVASCULAR NEGATIVE: 1

## 2025-04-13 NOTE — PROGRESS NOTES
Patient ID: Jon Lowe is a 80 y.o. male.    Treatment:   Oncology History Overview Note   Cancer History:          Leukemia - Chronic Lymphocytic         AJCC Edition: 7th, Diagnosis Date: 07-Oct-1993, Stage 1,         Treatment Synopsis:    1. .Hagen stage II CLL in complete remission after four cycles of fludarabine and Rituxan, completing in October 2004.  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  2 Stroke in January 2018 with loss of feeling in left arm and has residual numbness on left hip.  He has some mild residual word finding when he gets tired.  He had a right  carotid endarterectomy 1/24/18 at [facility] and he was on plavix temporarily.         Treatment History:    1. Chronic lymphocytic leukemia, Hagen stage II, diagnosed in 1994, and developed lymphadenopathy in May of 2004, and then underwent a bone marrow biopsy  examination that revealed 80% involvement with chronic lymphocytic leukemia. The patient is CD30 negative and ZAP-[id number] positive. The patient received treatment with FND plus Rituxan therapy on June 8, 2004, which was complicated by nausea, vomiting, and  gastrointestinal bleeding. This therapy was changed to fludarabine and Rituxan, and he received a total of four cycles from August 2, 2004, through October 11, 2004. Repeat bone marrow biopsy examination on August 2, 2004, demonstrated no evidence of  tumorous involvement either by pathologic review or flow cytometric analysis. The patient's chemotherapy course, however, was complicated by a prolonged pancytopenia.       Chronic lymphocytic leukemia (Multi)   5/16/2023 Initial Diagnosis    Chronic lymphocytic leukemia (CMS/HCC)       Response:     Past Medical History:  Herpes zoster involving the T6 and T8 dermatomes in April 2006, then re-involvement of the left shoulder and arm in 2006, Hypertension, History of ichthyosis vulgaris since infancy, evaluated by Dr. Dawson Smith of Dermatology, Status post multiple nevi,  Osteopenia, Seasonal allergies, History of gastroesophageal reflux disease, Stroke in January 2018 with loss of feeling in left arm and has residual numbness on left hip, He has some mild residual word finding when he gets tired.  He had a right carotid endarterectomy 1/24/18 at [facility].    Family History:  No family history on file.    Social History:  Wife passes away about 11 years ago.  Lives alone.  Son lives in [place].  -------------------------------------------------------------------------------------------------------  Subjective       HPI    Jon Lowe is a 80 year old male with CLL, s/p treatment with fludarabine and rituxan for 4 cycles completed in 2004.  Jon presents to the clinic today (4/14/25) unaccompanied for follow up for his CLL and blood work.      Has been feeling off over the past week, mild nausea, diarrhea, fatigue, mild dizziness.  Felt like he may have had a fever, but temperature was okay.  Has been eating and drinking okay.  Didn't test himself for flu or covid.  Is feeling better over the last 2 days.  Continues to have neck and shoulder pain.  Continues to enlarged lymph nodes to neck, staying the same.  Scheduled to have yearly US carotid artery, scheduled for 7/3/25 and follow up with vascular.  Has two rescue cats,that are about 1 year old, states his prior cat passed away that he had after his wife passed away 11 years ago.      Review of Systems   Constitutional:  Positive for fatigue. Negative for appetite change, chills, diaphoresis, fever and unexpected weight change.   Cardiovascular: Negative.    Gastrointestinal: Negative.    Genitourinary: Negative.     Musculoskeletal:  Positive for arthralgias.   Skin: Negative.    Neurological: Negative.    Hematological:  Positive for adenopathy (can feel in the neck).   -------------------------------------------------------------------------------------------------------  Objective   BSA: 2 meters squared  /67    Pulse 87   Temp 36.2 °C (97.2 °F)   Resp 17   Wt 81.8 kg (180 lb 5.4 oz)   SpO2 93%   BMI 26.63 kg/m²     Physical Exam  Vitals reviewed.   Constitutional:       Appearance: Normal appearance.   HENT:      Head: Normocephalic.   Eyes:      General: No scleral icterus.     Pupils: Pupils are equal, round, and reactive to light.   Cardiovascular:      Rate and Rhythm: Normal rate and regular rhythm.      Pulses: Normal pulses.      Heart sounds: Normal heart sounds.   Pulmonary:      Effort: Pulmonary effort is normal.      Breath sounds: Normal breath sounds.   Abdominal:      General: Abdomen is flat. Bowel sounds are normal.      Palpations: Abdomen is soft. There is no mass.      Tenderness: There is no abdominal tenderness.   Musculoskeletal:         General: No swelling. Normal range of motion.   Lymphadenopathy:      Comments: Small palpable, non-tender lymph nodes to bilateral cervical region, stable from prior visit   Skin:     General: Skin is warm and dry.   Neurological:      General: No focal deficit present.      Mental Status: He is alert and oriented to person, place, and time.   Psychiatric:         Mood and Affect: Mood normal.         Behavior: Behavior normal.     Performance Status:  ECOG Performance Status: 1 restricted from physically strenuous work  -------------------------------------------------------------------------------------------------------  Assessment/Plan      1. Chronic lymphocytic leukemia, Hagen stage II, in complete remission, status post four  cycles of fludarabine and Rituxan from August 2004 through October 2004. The patient continues to have relatively stable blood counts without  evidence of white blood cell count doubling in six months. Stable hgb. No recurrent infections. Additionally, no  splenomegaly on examination & stable, palpable cervical lymphadenopathy.     CT neck (1/21/18) - numerous cervical lymph nodes. For example,  there are submandibular lymph nodes  measuring up to 1.8 cm in greatest axial dimension on the right. There is a right level 3 lymph node measuring approximately 2.4 cm in greatest axial dimension. Its posterior margin is somewhat indistinct. There are  also numerous supraclavicular, axillary, and posterior pectoralis lymph nodes.    Recent WBC/lymphocyte count (6/29/23) are relatively stable (34/29) compared to previous values : 31/26 on 2/9/23, 28.3/22.67 on 5/16/22, 29.1/23.51 on 11/1/21,  26.8/22 on 5/3/21, 20.6/15.92,on 11/2/20,  24.2/17.89 on 5/4/20, 21.8/15.91 on 11/4/19, &  25.2/19.4 on 11/8/18).    He has preserved hgb (14.8) & stable mild thrombocytopenia (125k, 137k 180k, 140k, 128k, 124k, & 121k).  No indication for treatment at this time.    4/14/25:  Slight increase to WBC to 54.2 from 43.3 (8/12/24).   Stable hgb 14.0 and plt 110.  Continues to have small stable palpable cervical lymph nodes, same as at prior visit.  No other concerning findings on examination.  Continue to follow up in 6 months.  Advised to contact with any concerning symptoms.      WBC   Date Value Ref Range Status   04/14/2025 54.2 (HH) 4.4 - 11.3 x10*3/uL Preliminary   08/12/2024 43.3 (H) 4.4 - 11.3 x10*3/uL Final   05/20/2024 40.9 (H) 4.4 - 11.3 x10*3/uL Final   02/08/2024 37.2 (H) 4.4 - 11.3 x10*3/uL Final   06/29/2023 34.0 (H) 4.4 - 11.3 x10E9/L Final   06/29/2023 CANCELED       Comment:     Result canceled by the ancillary.   02/09/2023 30.9 (H) 4.4 - 11.3 x10E9/L Final   05/16/2022 28.3 (H) 4.4 - 11.3 x10E9/L Final   01/15/2022 23.0 (H) 4.4 - 11.3 x10E9/L Final     Hemoglobin   Date Value Ref Range Status   04/14/2025 14.0 13.5 - 17.5 g/dL Preliminary   08/12/2024 14.4 13.5 - 17.5 g/dL Final   05/20/2024 14.0 13.5 - 17.5 g/dL Final     Platelets   Date Value Ref Range Status   04/14/2025 110 (L) 150 - 450 x10*3/uL Preliminary   08/12/2024 125 (L) 150 - 450 x10*3/uL Final   05/20/2024 141 (L) 150 - 450 x10*3/uL Final     Annual carotid artery duplex ultrasound  (6/19/23) - Multiple enlarged lymph nodes visualized bilaterally within the neck. The largest measurment on the right side is 9.60 mm x 31.26 mm. The largest measurement on the left side is 12.20 mm x 21.69 mm.  Last performed 6/13/24, with no mention of lymph nodes.    Scheduled for yearly US and follow up with vascular 7/3/25.      2. Hypertensive - despite current BP regimen, management of recent stroke with bp control and aspirin.  Instructed patient to monitor &  log BP at home, then follow up with his PCP.    Currently on Lisinopril 10 mg daily.  /67 today.      3. Vaccines:   Pneumovax (10/06/14).  Received influenza 11/28/23.  Needs pneumovax updated.  Advised to get shingrix vaccine again.  He recv'd his first dose of covid vaccination (Moderna) in April 2021, but has not recv'd his second dose yet.   Has not received updated vaccinations, advised to receive influenza, shingrix, pneumonia, RSV, covid vaccines at local pharmacy.    4. Dermatology: diffuse actinic keratoses. Follows with Dermatology and his moles were evaluated. He will continue with a yearly full body evaluation.  Using Acyclovir as needed since recent zoster outbreak in May 2019.    History of Stroke 2018, Bilateral carotid Artery Stenosis:  - Following with vascular surgery, obtains yearly carotid duplex, last done June 2024, as above.    -vascular surgeon advised to stop aspirin r/t GI issues    RTC:  Follow up visit with provider in 6 months with blood work prior.  Advised to call with any new concerns/issues.    -------------------------------------------------------------------------------------------------------  WAYNE Lowery

## 2025-04-14 ENCOUNTER — OFFICE VISIT (OUTPATIENT)
Dept: HEMATOLOGY/ONCOLOGY | Facility: HOSPITAL | Age: 80
End: 2025-04-14
Payer: MEDICARE

## 2025-04-14 ENCOUNTER — DOCUMENTATION (OUTPATIENT)
Dept: HEMATOLOGY/ONCOLOGY | Facility: HOSPITAL | Age: 80
End: 2025-04-14

## 2025-04-14 ENCOUNTER — LAB (OUTPATIENT)
Dept: LAB | Facility: HOSPITAL | Age: 80
End: 2025-04-14
Payer: MEDICARE

## 2025-04-14 VITALS
WEIGHT: 180.34 LBS | BODY MASS INDEX: 26.63 KG/M2 | SYSTOLIC BLOOD PRESSURE: 136 MMHG | OXYGEN SATURATION: 93 % | HEART RATE: 87 BPM | DIASTOLIC BLOOD PRESSURE: 67 MMHG | RESPIRATION RATE: 17 BRPM | TEMPERATURE: 97.2 F

## 2025-04-14 DIAGNOSIS — C91.10 CHRONIC LYMPHOCYTIC LEUKEMIA (MULTI): Primary | ICD-10-CM

## 2025-04-14 DIAGNOSIS — F33.41 RECURRENT MAJOR DEPRESSIVE DISORDER, IN PARTIAL REMISSION (CMS-HCC): ICD-10-CM

## 2025-04-14 DIAGNOSIS — R97.20 ELEVATED PSA: Primary | ICD-10-CM

## 2025-04-14 DIAGNOSIS — Z86.73 HISTORY OF EMBOLIC STROKE: ICD-10-CM

## 2025-04-14 DIAGNOSIS — I10 BENIGN ESSENTIAL HYPERTENSION: ICD-10-CM

## 2025-04-14 DIAGNOSIS — C91.10 CHRONIC LYMPHOCYTIC LEUKEMIA (MULTI): ICD-10-CM

## 2025-04-14 DIAGNOSIS — Z00.00 HEALTHCARE MAINTENANCE: ICD-10-CM

## 2025-04-14 DIAGNOSIS — Z12.5 SCREENING PSA (PROSTATE SPECIFIC ANTIGEN): ICD-10-CM

## 2025-04-14 DIAGNOSIS — E11.21 DIABETES MELLITUS WITH NEPHROPATHY (MULTI): ICD-10-CM

## 2025-04-14 LAB
ALBUMIN SERPL BCP-MCNC: 4.7 G/DL (ref 3.4–5)
ALP SERPL-CCNC: 89 U/L (ref 33–136)
ALT SERPL W P-5'-P-CCNC: 15 U/L (ref 10–52)
ANION GAP SERPL CALC-SCNC: 16 MMOL/L (ref 10–20)
AST SERPL W P-5'-P-CCNC: 14 U/L (ref 9–39)
BASOPHILS # BLD AUTO: 0.06 X10*3/UL (ref 0–0.1)
BASOPHILS NFR BLD AUTO: 0.1 %
BILIRUB SERPL-MCNC: 0.6 MG/DL (ref 0–1.2)
BUN SERPL-MCNC: 20 MG/DL (ref 6–23)
CALCIUM SERPL-MCNC: 8.9 MG/DL (ref 8.6–10.3)
CHLORIDE SERPL-SCNC: 104 MMOL/L (ref 98–107)
CHOLEST SERPL-MCNC: 129 MG/DL (ref 0–199)
CHOLESTEROL/HDL RATIO: 3.3
CO2 SERPL-SCNC: 25 MMOL/L (ref 21–32)
CREAT SERPL-MCNC: 1.46 MG/DL (ref 0.5–1.3)
EGFRCR SERPLBLD CKD-EPI 2021: 48 ML/MIN/1.73M*2
EOSINOPHIL # BLD AUTO: 0.19 X10*3/UL (ref 0–0.4)
EOSINOPHIL NFR BLD AUTO: 0.4 %
ERYTHROCYTE [DISTWIDTH] IN BLOOD BY AUTOMATED COUNT: 14.3 % (ref 11.5–14.5)
EST. AVERAGE GLUCOSE BLD GHB EST-MCNC: 148 MG/DL
GLUCOSE SERPL-MCNC: 119 MG/DL (ref 74–99)
HBA1C MFR BLD: 6.8 %
HCT VFR BLD AUTO: 43.3 % (ref 41–52)
HDLC SERPL-MCNC: 39.1 MG/DL
HGB BLD-MCNC: 14 G/DL (ref 13.5–17.5)
IMM GRANULOCYTES # BLD AUTO: 0.09 X10*3/UL (ref 0–0.5)
IMM GRANULOCYTES NFR BLD AUTO: 0.2 % (ref 0–0.9)
LDH SERPL L TO P-CCNC: 130 U/L (ref 84–246)
LDLC SERPL CALC-MCNC: 23 MG/DL
LYMPHOCYTES # BLD AUTO: 47.87 X10*3/UL (ref 0.8–3)
LYMPHOCYTES NFR BLD AUTO: 88.4 %
MCH RBC QN AUTO: 29.2 PG (ref 26–34)
MCHC RBC AUTO-ENTMCNC: 32.3 G/DL (ref 32–36)
MCV RBC AUTO: 90 FL (ref 80–100)
MONOCYTES # BLD AUTO: 1.84 X10*3/UL (ref 0.05–0.8)
MONOCYTES NFR BLD AUTO: 3.4 %
NEUTROPHILS # BLD AUTO: 4.12 X10*3/UL (ref 1.6–5.5)
NEUTROPHILS NFR BLD AUTO: 7.5 %
NON HDL CHOLESTEROL: 90 MG/DL (ref 0–149)
NRBC BLD-RTO: 0 /100 WBCS (ref 0–0)
OVALOCYTES BLD QL SMEAR: NORMAL
PLATELET # BLD AUTO: 110 X10*3/UL (ref 150–450)
POTASSIUM SERPL-SCNC: 4.5 MMOL/L (ref 3.5–5.3)
PROT SERPL-MCNC: 6.4 G/DL (ref 6.4–8.2)
PSA SERPL-MCNC: 4.35 NG/ML
RBC # BLD AUTO: 4.79 X10*6/UL (ref 4.5–5.9)
RBC MORPH BLD: NORMAL
SODIUM SERPL-SCNC: 140 MMOL/L (ref 136–145)
T4 FREE SERPL-MCNC: 1.26 NG/DL (ref 0.78–1.48)
TRIGL SERPL-MCNC: 335 MG/DL (ref 0–149)
TSH SERPL-ACNC: 4.42 MIU/L (ref 0.44–3.98)
VLDL: 67 MG/DL (ref 0–40)
WBC # BLD AUTO: 54.2 X10*3/UL (ref 4.4–11.3)

## 2025-04-14 PROCEDURE — 80053 COMPREHEN METABOLIC PANEL: CPT

## 2025-04-14 PROCEDURE — 1160F RVW MEDS BY RX/DR IN RCRD: CPT

## 2025-04-14 PROCEDURE — 99214 OFFICE O/P EST MOD 30 MIN: CPT

## 2025-04-14 PROCEDURE — 83036 HEMOGLOBIN GLYCOSYLATED A1C: CPT

## 2025-04-14 PROCEDURE — 3075F SYST BP GE 130 - 139MM HG: CPT

## 2025-04-14 PROCEDURE — 1125F AMNT PAIN NOTED PAIN PRSNT: CPT

## 2025-04-14 PROCEDURE — 1159F MED LIST DOCD IN RCRD: CPT

## 2025-04-14 PROCEDURE — 85025 COMPLETE CBC W/AUTO DIFF WBC: CPT

## 2025-04-14 PROCEDURE — 84443 ASSAY THYROID STIM HORMONE: CPT

## 2025-04-14 PROCEDURE — 83718 ASSAY OF LIPOPROTEIN: CPT

## 2025-04-14 PROCEDURE — 1157F ADVNC CARE PLAN IN RCRD: CPT

## 2025-04-14 PROCEDURE — 83615 LACTATE (LD) (LDH) ENZYME: CPT

## 2025-04-14 PROCEDURE — 1036F TOBACCO NON-USER: CPT

## 2025-04-14 PROCEDURE — 1123F ACP DISCUSS/DSCN MKR DOCD: CPT

## 2025-04-14 PROCEDURE — 84153 ASSAY OF PSA TOTAL: CPT

## 2025-04-14 PROCEDURE — 84439 ASSAY OF FREE THYROXINE: CPT

## 2025-04-14 PROCEDURE — 36415 COLL VENOUS BLD VENIPUNCTURE: CPT

## 2025-04-14 PROCEDURE — 3078F DIAST BP <80 MM HG: CPT

## 2025-04-14 ASSESSMENT — ENCOUNTER SYMPTOMS
FATIGUE: 1
ARTHRALGIAS: 1

## 2025-04-14 ASSESSMENT — PAIN SCALES - GENERAL: PAINLEVEL_OUTOF10: 2

## 2025-05-19 ENCOUNTER — APPOINTMENT (OUTPATIENT)
Dept: PRIMARY CARE | Facility: CLINIC | Age: 80
End: 2025-05-19
Payer: MEDICARE

## 2025-05-19 VITALS
HEIGHT: 69 IN | SYSTOLIC BLOOD PRESSURE: 134 MMHG | WEIGHT: 180 LBS | OXYGEN SATURATION: 96 % | RESPIRATION RATE: 12 BRPM | DIASTOLIC BLOOD PRESSURE: 84 MMHG | BODY MASS INDEX: 26.66 KG/M2 | HEART RATE: 72 BPM

## 2025-05-19 DIAGNOSIS — C91.10 CHRONIC LYMPHOCYTIC LEUKEMIA (MULTI): ICD-10-CM

## 2025-05-19 DIAGNOSIS — F33.41 RECURRENT MAJOR DEPRESSIVE DISORDER, IN PARTIAL REMISSION: ICD-10-CM

## 2025-05-19 DIAGNOSIS — I65.23 BILATERAL CAROTID ARTERY STENOSIS: ICD-10-CM

## 2025-05-19 DIAGNOSIS — R97.20 ELEVATED PSA: ICD-10-CM

## 2025-05-19 DIAGNOSIS — E78.2 MIXED HYPERLIPIDEMIA: ICD-10-CM

## 2025-05-19 DIAGNOSIS — Z00.00 HEALTHCARE MAINTENANCE: Primary | ICD-10-CM

## 2025-05-19 DIAGNOSIS — I10 BENIGN ESSENTIAL HYPERTENSION: ICD-10-CM

## 2025-05-19 DIAGNOSIS — E03.8 SUBCLINICAL HYPOTHYROIDISM: ICD-10-CM

## 2025-05-19 DIAGNOSIS — D72.829 LEUKOCYTOSIS, UNSPECIFIED TYPE: ICD-10-CM

## 2025-05-19 DIAGNOSIS — N18.31 STAGE 3A CHRONIC KIDNEY DISEASE (MULTI): ICD-10-CM

## 2025-05-19 DIAGNOSIS — K21.9 GASTROESOPHAGEAL REFLUX DISEASE WITHOUT ESOPHAGITIS: ICD-10-CM

## 2025-05-19 DIAGNOSIS — E11.21 DIABETES MELLITUS WITH NEPHROPATHY (MULTI): ICD-10-CM

## 2025-05-19 PROCEDURE — 1159F MED LIST DOCD IN RCRD: CPT | Performed by: FAMILY MEDICINE

## 2025-05-19 PROCEDURE — 3075F SYST BP GE 130 - 139MM HG: CPT | Performed by: FAMILY MEDICINE

## 2025-05-19 PROCEDURE — 1170F FXNL STATUS ASSESSED: CPT | Performed by: FAMILY MEDICINE

## 2025-05-19 PROCEDURE — 1158F ADVNC CARE PLAN TLK DOCD: CPT | Performed by: FAMILY MEDICINE

## 2025-05-19 PROCEDURE — 1036F TOBACCO NON-USER: CPT | Performed by: FAMILY MEDICINE

## 2025-05-19 PROCEDURE — 99214 OFFICE O/P EST MOD 30 MIN: CPT | Performed by: FAMILY MEDICINE

## 2025-05-19 PROCEDURE — G0439 PPPS, SUBSEQ VISIT: HCPCS | Performed by: FAMILY MEDICINE

## 2025-05-19 PROCEDURE — 3079F DIAST BP 80-89 MM HG: CPT | Performed by: FAMILY MEDICINE

## 2025-05-19 RX ORDER — ATORVASTATIN CALCIUM 20 MG/1
20 TABLET, FILM COATED ORAL DAILY
Qty: 100 TABLET | Refills: 1 | Status: SHIPPED | OUTPATIENT
Start: 2025-05-19

## 2025-05-19 RX ORDER — PANTOPRAZOLE SODIUM 40 MG/1
40 TABLET, DELAYED RELEASE ORAL
Qty: 100 TABLET | Refills: 1 | Status: SHIPPED | OUTPATIENT
Start: 2025-05-19

## 2025-05-19 RX ORDER — LISINOPRIL 10 MG/1
10 TABLET ORAL DAILY
Qty: 100 TABLET | Refills: 1 | Status: SHIPPED | OUTPATIENT
Start: 2025-05-19

## 2025-05-19 RX ORDER — CITALOPRAM 20 MG/1
20 TABLET ORAL DAILY
Qty: 100 TABLET | Refills: 1 | Status: SHIPPED | OUTPATIENT
Start: 2025-05-19

## 2025-05-19 ASSESSMENT — ENCOUNTER SYMPTOMS
HEADACHES: 0
ARTHRALGIAS: 0
OCCASIONAL FEELINGS OF UNSTEADINESS: 0
CHILLS: 0
BACK PAIN: 0
APPETITE CHANGE: 0
EYE REDNESS: 0
DEPRESSION: 0
CONSTIPATION: 0
BRUISES/BLEEDS EASILY: 0
DIFFICULTY URINATING: 0
COUGH: 0
CHEST TIGHTNESS: 0
EYE PAIN: 0
FEVER: 0
WEAKNESS: 0
ABDOMINAL DISTENTION: 0
LOSS OF SENSATION IN FEET: 0
DYSURIA: 0
DIARRHEA: 0
ADENOPATHY: 0
NERVOUS/ANXIOUS: 0
DIZZINESS: 0
FATIGUE: 0
ABDOMINAL PAIN: 0
SORE THROAT: 0
BLOOD IN STOOL: 0
SHORTNESS OF BREATH: 0
DYSPHORIC MOOD: 0

## 2025-05-19 ASSESSMENT — PATIENT HEALTH QUESTIONNAIRE - PHQ9
10. IF YOU CHECKED OFF ANY PROBLEMS, HOW DIFFICULT HAVE THESE PROBLEMS MADE IT FOR YOU TO DO YOUR WORK, TAKE CARE OF THINGS AT HOME, OR GET ALONG WITH OTHER PEOPLE: NOT DIFFICULT AT ALL
8. MOVING OR SPEAKING SO SLOWLY THAT OTHER PEOPLE COULD HAVE NOTICED. OR THE OPPOSITE, BEING SO FIGETY OR RESTLESS THAT YOU HAVE BEEN MOVING AROUND A LOT MORE THAN USUAL: NOT AT ALL
2. FEELING DOWN, DEPRESSED OR HOPELESS: NOT AT ALL
5. POOR APPETITE OR OVEREATING: NOT AT ALL
SUM OF ALL RESPONSES TO PHQ QUESTIONS 1-9: 0
7. TROUBLE CONCENTRATING ON THINGS, SUCH AS READING THE NEWSPAPER OR WATCHING TELEVISION: NOT AT ALL
3. TROUBLE FALLING OR STAYING ASLEEP OR SLEEPING TOO MUCH: NOT AT ALL
4. FEELING TIRED OR HAVING LITTLE ENERGY: NOT AT ALL
9. THOUGHTS THAT YOU WOULD BE BETTER OFF DEAD, OR OF HURTING YOURSELF: NOT AT ALL
1. LITTLE INTEREST OR PLEASURE IN DOING THINGS: NOT AT ALL
SUM OF ALL RESPONSES TO PHQ9 QUESTIONS 1 AND 2: 0
6. FEELING BAD ABOUT YOURSELF - OR THAT YOU ARE A FAILURE OR HAVE LET YOURSELF OR YOUR FAMILY DOWN: NOT AT ALL

## 2025-05-19 ASSESSMENT — ACTIVITIES OF DAILY LIVING (ADL)
BATHING: INDEPENDENT
DRESSING: INDEPENDENT
GROCERY_SHOPPING: INDEPENDENT
MANAGING_FINANCES: INDEPENDENT
TAKING_MEDICATION: INDEPENDENT
DOING_HOUSEWORK: INDEPENDENT

## 2025-05-19 NOTE — PROGRESS NOTES
"Subjective   Reason for Visit: Jon Lowe is an 80 y.o. male here for a Medicare Wellness visit.     Past Medical, Surgical, and Family History reviewed and updated in chart.    Reviewed all medications by prescribing practitioner or clinical pharmacist (such as prescriptions, OTCs, herbal therapies and supplements) and documented in the medical record.    HPI    Patient Care Team:  Lamonte Artis MD as PCP - General  Lamonte Artis MD as PCP - [employer] PCP     Review of Systems    Objective   Vitals:  /84   Pulse 72   Resp 12   Ht 1.753 m (5' 9\")   Wt 81.6 kg (180 lb)   SpO2 96%   BMI 26.58 kg/m²       Physical Exam    Assessment & Plan  Gastroesophageal reflux disease without esophagitis         Benign essential hypertension         Recurrent major depressive disorder, in partial remission         Mixed hyperlipidemia                   "

## 2025-05-19 NOTE — PROGRESS NOTES
Subjective   Patient ID: Jon Lowe is a 80 y.o. male who presents for Medicare Annual Wellness Visit Subsequent.  PMHX, PSHx, Fam hx, and Social hx reviewed.   New concerns none  Vaccines Tetanus, Shingles and Prevnar shot recommended  Dentist seen at least yearly yes  Vision concerns sees ophthalmology  Hearing concerns none  Diet is overall healthy.   Smoker - no  Lung CT/screening - NA  AAA screening - NA  Alcohol use - averages 0drinks per week  Exercising 3-5 days per week.   Cologuards were negative  ACP - advance directives, code status, and DPOA documentation discussed     Pt is here for f/u Type 2 diabetes.   Pt is following low carb diet, and is exercising 3-5days per week.  Taking no medication. 1 x daily accucheks .   A1c 6.8  Eye exam is current  Pt does not have foot pain, paresthesias, or numbness in feet. Foot checks daily - yes .  Following with podiatry -  no   Denies vision changes, abdominal pain, excessive thirst, frequent urination.     Pt has chronic HTN.  Pt is taking Lisinopril. Tolerating well.  Exercising 3-5 days per week   Low sodium diet is being followed.   Is monitoring home blood pressures.  Runs in 130s/80s  Denies HA, vision changes or CP.     Pt has Dyslipidemia, hx CVA and Carotid stenosis. Monitoring US in July.   Lipid panel showed LDL in good range, .  Currently taking Atorvastatin and is tolerating well without muscle pains or weakness.     Pt has stable CRI.  GFR is  48. BP is controlled and pt is following low sodium diet.      For hx CLL, WBC has been increasing. Not on tx, monitoring with oncology.    GERD is well controlled on Pantoprazole . Pt is taking medication daily. Denies epigastric pain, nausea, heartburn or water brash.     Pt has chronic and stable depression / anxiety.  Pt not has supportive family/friends.   Pt seeing a counselor.   Taking Celexa and it is helping.   Mood, energy, motivation, interests, sleep and appetite are adequate.  "Anxiety is stable.  Pt denies suicidal ideations.             Review of Systems   Constitutional:  Negative for appetite change, chills, fatigue and fever.   HENT:  Negative for congestion, hearing loss and sore throat.    Eyes:  Negative for pain, redness and visual disturbance.   Respiratory:  Negative for cough, chest tightness and shortness of breath.    Cardiovascular:  Negative for chest pain and leg swelling.   Gastrointestinal:  Negative for abdominal distention, abdominal pain, blood in stool, constipation and diarrhea.   Genitourinary:  Negative for difficulty urinating and dysuria.   Musculoskeletal:  Negative for arthralgias and back pain.   Skin:  Negative for rash.   Neurological:  Negative for dizziness, weakness and headaches.   Hematological:  Negative for adenopathy. Does not bruise/bleed easily.   Psychiatric/Behavioral:  Negative for dysphoric mood. The patient is not nervous/anxious.        Objective   /84   Pulse 72   Resp 12   Ht 1.753 m (5' 9\")   Wt 81.6 kg (180 lb)   SpO2 96%   BMI 26.58 kg/m²    Physical Exam  Constitutional:       General: He is not in acute distress.     Appearance: Normal appearance. He is not ill-appearing.   HENT:      Head: Normocephalic and atraumatic.      Right Ear: Tympanic membrane, ear canal and external ear normal. There is no impacted cerumen.      Left Ear: Tympanic membrane, ear canal and external ear normal. There is no impacted cerumen.      Nose: Nose normal. No congestion.      Mouth/Throat:      Mouth: Mucous membranes are moist.      Pharynx: No oropharyngeal exudate or posterior oropharyngeal erythema.   Eyes:      Conjunctiva/sclera: Conjunctivae normal.   Neck:      Thyroid: No thyroid mass or thyromegaly.      Vascular: No carotid bruit.   Cardiovascular:      Rate and Rhythm: Normal rate and regular rhythm.      Heart sounds: Normal heart sounds. No murmur heard.  Pulmonary:      Breath sounds: Normal breath sounds. No wheezing, " rhonchi or rales.   Abdominal:      General: Bowel sounds are normal. There is no distension.      Palpations: Abdomen is soft. There is no mass.      Tenderness: There is no abdominal tenderness.   Genitourinary:     Comments: Pt declines SHARONA  Musculoskeletal:         General: No swelling or deformity.      Cervical back: Neck supple. No tenderness.   Lymphadenopathy:      Cervical: No cervical adenopathy.   Skin:     General: Skin is warm and dry.      Findings: No lesion or rash.   Neurological:      Mental Status: He is alert and oriented to person, place, and time.      Sensory: No sensory deficit.      Motor: No weakness.      Coordination: Coordination normal.      Deep Tendon Reflexes: Reflexes normal.   Psychiatric:         Mood and Affect: Mood normal.         Behavior: Behavior normal.         Judgment: Judgment normal.   Medicare Wellness Billing Compliance Satisfied    *This is a visual tool to show completion of required items on the day of the visit. Green checks will only appear on the date of visit.    Review all medications by prescribing practitioner or clinical pharmacist (such as prescriptions, OTCs, herbal therapies and supplements) documented in the medical record    Past Medical, Surgical, and Family History reviewed and updated in chart    Tobacco Use Reviewed    Alcohol Use Reviewed    Illicit Drug Use Reviewed    PHQ2/9    Falls in Last Year Reviewed    Home Safety Risk Factors Reviewed    Cognitive Impairment Reviewed    Patient Self Assessment and Health Status    Current Diet Reviewed    Exercise Frequency    ADL - Hearing Impairment    ADL - Bathing    ADL - Dressing    ADL - Walks in Home    IADL - Managing Finances    IADL - Grocery Shopping    IADL - Taking Medications    IADL - Doing Housework          Assessment/Plan   Diagnoses and all orders for this visit:  Healthcare maintenance - Tetanus, Shingles and Prevnar shot recommended. Labs reviewed and discussed.  Colonoscopy not indicated. Ordering bone density test.   Elevated PSA - mild in April, recheck PSA lab  Diabetes mellitus with nephropathy  - well controlled per A1c, continue low carb/sugar diet and regular exercise.   Benign essential hypertension - well controlled on Lisinopril, continue and monitor  Bilateral carotid artery stenosis - stable, monitoring with US in July  Recurrent major depressive disorder - stable on Celexa, continue and monitor  Mixed hyperlipidemia - well controlled on Atorvastatin, continue  Subclinical hypothyroidism - monitoring with TSH  Stage 3a chronic kidney disease - stable, monitor  Chronic lymphocytic leukemia/Leukocytosis -  monitoring with Hematology  Gastroesophageal reflux disease  - well controlled on Pantoprazole, continue lowest effective dose.    Follow up in 6 months, 30mins

## 2025-07-03 ENCOUNTER — OFFICE VISIT (OUTPATIENT)
Dept: VASCULAR SURGERY | Facility: CLINIC | Age: 80
End: 2025-07-03
Payer: MEDICARE

## 2025-07-03 ENCOUNTER — HOSPITAL ENCOUNTER (OUTPATIENT)
Dept: VASCULAR MEDICINE | Facility: CLINIC | Age: 80
Discharge: HOME | End: 2025-07-03
Payer: MEDICARE

## 2025-07-03 VITALS
DIASTOLIC BLOOD PRESSURE: 77 MMHG | HEART RATE: 65 BPM | SYSTOLIC BLOOD PRESSURE: 154 MMHG | WEIGHT: 180 LBS | HEIGHT: 69 IN | BODY MASS INDEX: 26.66 KG/M2

## 2025-07-03 DIAGNOSIS — I65.21 OCCLUSION AND STENOSIS OF RIGHT CAROTID ARTERY: ICD-10-CM

## 2025-07-03 DIAGNOSIS — I65.23 BILATERAL CAROTID ARTERY STENOSIS: ICD-10-CM

## 2025-07-03 DIAGNOSIS — I65.23 BILATERAL CAROTID ARTERY STENOSIS: Primary | ICD-10-CM

## 2025-07-03 PROCEDURE — 93880 EXTRACRANIAL BILAT STUDY: CPT | Performed by: INTERNAL MEDICINE

## 2025-07-03 PROCEDURE — 99213 OFFICE O/P EST LOW 20 MIN: CPT | Performed by: NURSE PRACTITIONER

## 2025-07-03 PROCEDURE — 1036F TOBACCO NON-USER: CPT | Performed by: NURSE PRACTITIONER

## 2025-07-03 PROCEDURE — 3077F SYST BP >= 140 MM HG: CPT | Performed by: NURSE PRACTITIONER

## 2025-07-03 PROCEDURE — 1125F AMNT PAIN NOTED PAIN PRSNT: CPT | Performed by: NURSE PRACTITIONER

## 2025-07-03 PROCEDURE — 93880 EXTRACRANIAL BILAT STUDY: CPT

## 2025-07-03 PROCEDURE — 99213 OFFICE O/P EST LOW 20 MIN: CPT | Mod: 25 | Performed by: NURSE PRACTITIONER

## 2025-07-03 PROCEDURE — 3078F DIAST BP <80 MM HG: CPT | Performed by: NURSE PRACTITIONER

## 2025-07-03 PROCEDURE — 1159F MED LIST DOCD IN RCRD: CPT | Performed by: NURSE PRACTITIONER

## 2025-07-03 ASSESSMENT — ENCOUNTER SYMPTOMS
LIGHT-HEADEDNESS: 0
TREMORS: 0
HEADACHES: 0
APPETITE CHANGE: 0
HEMATOLOGIC/LYMPHATIC NEGATIVE: 1
ACTIVITY CHANGE: 0
LOSS OF SENSATION IN FEET: 0
SEIZURES: 0
EYES NEGATIVE: 1
DIZZINESS: 0
PALPITATIONS: 0
SHORTNESS OF BREATH: 0
NEUROLOGICAL NEGATIVE: 1
WEAKNESS: 0
SPEECH DIFFICULTY: 0
CHEST TIGHTNESS: 0
FACIAL ASYMMETRY: 0
NUMBNESS: 0
OCCASIONAL FEELINGS OF UNSTEADINESS: 0
PSYCHIATRIC NEGATIVE: 1
UNEXPECTED WEIGHT CHANGE: 0
ENDOCRINE NEGATIVE: 1
GASTROINTESTINAL NEGATIVE: 1
DEPRESSION: 0
MUSCULOSKELETAL NEGATIVE: 1
ALLERGIC/IMMUNOLOGIC NEGATIVE: 1

## 2025-07-03 ASSESSMENT — COLUMBIA-SUICIDE SEVERITY RATING SCALE - C-SSRS
6. HAVE YOU EVER DONE ANYTHING, STARTED TO DO ANYTHING, OR PREPARED TO DO ANYTHING TO END YOUR LIFE?: NO
2. HAVE YOU ACTUALLY HAD ANY THOUGHTS OF KILLING YOURSELF?: NO
1. IN THE PAST MONTH, HAVE YOU WISHED YOU WERE DEAD OR WISHED YOU COULD GO TO SLEEP AND NOT WAKE UP?: NO

## 2025-07-03 ASSESSMENT — PAIN SCALES - GENERAL: PAINLEVEL_OUTOF10: 8

## 2025-07-03 ASSESSMENT — PATIENT HEALTH QUESTIONNAIRE - PHQ9
1. LITTLE INTEREST OR PLEASURE IN DOING THINGS: NOT AT ALL
SUM OF ALL RESPONSES TO PHQ9 QUESTIONS 1 AND 2: 0
2. FEELING DOWN, DEPRESSED OR HOPELESS: NOT AT ALL

## 2025-07-03 NOTE — PROGRESS NOTES
NPV REASON: carotid artery stenosis    CURRENT ENCOUNTER:  Jon Lowe is 80 y.o. male here for follow up of routine surveillance s/p right CEA 2018 w/CVA, no notes he is off aspirin due to Chronic lymphocytic leukemia, Hagen stage II, in complete remission, status post four cycles of fludarabine and Rituxan from August 2004 through October 2004.     Denies any changes from last year. No further dizziness    Retired /EMT    PastMedHX:  Past Medical History:   Diagnosis Date    Acute sinusitis, unspecified 11/23/2015    Acute sinusitis, recurrence not specified, unspecified location    Benign paroxysmal vertigo, unspecified ear 03/20/2019    Benign paroxysmal positional vertigo    Chronic kidney disease, stage 3 unspecified (Multi) 06/23/2020    Stage 3 chronic kidney disease    Dizziness 05/16/2023    Essential (primary) hypertension 05/22/2018    White coat syndrome with hypertension    Hyponatremia 11/21/2023    IFG (impaired fasting glucose) 05/16/2023    Impacted cerumen of right ear 05/16/2023    Mild renal insufficiency 05/16/2023    Personal history of other diseases of the circulatory system 05/10/2018    History of hypertension    Personal history of other diseases of the digestive system 03/20/2019    History of gastroesophageal reflux (GERD)    Personal history of other specified conditions 01/30/2018    History of epistaxis    Personal history of other specified conditions 04/06/2018    History of nasal congestion    Personal history of transient ischemic attack (TIA), and cerebral infarction without residual deficits 03/20/2019    History of embolic stroke    Transient ischemic attack 11/21/2023     Meds:     Current Outpatient Medications:     ascorbic acid, vitamin C, 1,000 mg ER tablet, Take 1 tablet (1,000 mg) by mouth once daily., Disp: , Rfl:     atorvastatin (Lipitor) 20 mg tablet, Take 1 tablet (20 mg) by mouth once daily., Disp: 100 tablet, Rfl: 1    calcium carbonate-vitamin D3  600 mg-5 mcg (200 unit) tablet, Take by mouth., Disp: , Rfl:     cetirizine (ZyrTEC) 10 mg tablet, Take by mouth., Disp: , Rfl:     cholecalciferol (Vitamin D-3) 50 mcg (2,000 unit) capsule, Take 1 capsule (50 mcg) by mouth once daily., Disp: , Rfl:     citalopram (CeleXA) 20 mg tablet, Take 1 tablet (20 mg) by mouth once daily., Disp: 100 tablet, Rfl: 1    garlic 500 mg capsule, Take 500 mg by mouth., Disp: , Rfl:     lisinopril 10 mg tablet, Take 1 tablet (10 mg) by mouth once daily. for blood pressure, Disp: 100 tablet, Rfl: 1    magnesium oxide 400 mg magnesium capsule, Take 1 capsule (400 mg) by mouth once daily., Disp: , Rfl:     pantoprazole (ProtoNix) 40 mg EC tablet, Take 1 tablet (40 mg) by mouth once daily in the morning. Take before meals. Do not crush, chew, or split., Disp: 100 tablet, Rfl: 1    sodium chloride (Lily 128) 5 % ophthalmic solution, Administer into affected eye(s)., Disp: , Rfl:     triamcinolone (Kenalog) 0.1 % cream, 1 Application, Disp: , Rfl:     Allergies:   Allergies   Allergen Reactions    Ultracet [Tramadol-Acetaminophen] Nausea/vomiting    Hydralazine Hives    Levofloxacin Unknown     ROS:  Review of Systems   Constitutional:  Negative for activity change, appetite change and unexpected weight change.   HENT: Negative.     Eyes: Negative.    Respiratory:  Negative for chest tightness and shortness of breath.    Cardiovascular:  Negative for chest pain and palpitations.   Gastrointestinal: Negative.    Endocrine: Negative.    Genitourinary: Negative.    Musculoskeletal: Negative.    Skin: Negative.    Allergic/Immunologic: Negative.    Neurological: Negative.  Negative for dizziness, tremors, seizures, syncope, facial asymmetry, speech difficulty, weakness, light-headedness, numbness and headaches.   Hematological: Negative.    Psychiatric/Behavioral: Negative.        Objective:  Vitals:  Vitals:    07/03/25 1438   BP: 154/77   Pulse: 65   /77 (BP Location: Left arm, Patient  "Position: Sitting, BP Cuff Size: Adult)   Pulse 65   Ht 1.753 m (5' 9\")   Wt 81.6 kg (180 lb)   BMI 26.58 kg/m²      Physical Exam  HENT:      Head: Normocephalic and atraumatic.      Nose: Nose normal.      Mouth/Throat:      Mouth: Mucous membranes are moist.   Eyes:      Conjunctiva/sclera: Conjunctivae normal.   Cardiovascular:      Rate and Rhythm: Normal rate.      Pulses: Normal pulses.   Pulmonary:      Effort: Pulmonary effort is normal.   Musculoskeletal:         General: Normal range of motion.      Cervical back: Normal range of motion.   Skin:     General: Skin is warm and dry.      Capillary Refill: Capillary refill takes less than 2 seconds.      Comments: Reticular veins to BLE   Neurological:      General: No focal deficit present.      Mental Status: He is alert and oriented to person, place, and time.   Psychiatric:         Mood and Affect: Mood normal.         Behavior: Behavior normal.         Thought Content: Thought content normal.         Judgment: Judgment normal.     Labs:  Lab Results   Component Value Date    WBC 54.2 (HH) 04/14/2025    WBC 43.3 (H) 08/12/2024    WBC 40.9 (H) 05/20/2024    HGB 14.0 04/14/2025    HGB 14.4 08/12/2024    HGB 14.0 05/20/2024    HCT 43.3 04/14/2025    HCT 43.7 08/12/2024    HCT 45.2 05/20/2024    MCV 90 04/14/2025    MCV 89 08/12/2024    MCV 92 05/20/2024     (L) 04/14/2025     Lab Results   Component Value Date    CREATININE 1.46 (H) 04/14/2025    CREATININE 1.45 (H) 08/12/2024    CREATININE 1.32 (H) 05/20/2024    BUN 20 04/14/2025    BUN 19 08/12/2024    BUN 19 05/20/2024     04/14/2025     08/12/2024     05/20/2024    K 4.5 04/14/2025    K 5.3 08/12/2024    K 4.8 05/20/2024     04/14/2025     08/12/2024     05/20/2024    CO2 25 04/14/2025    CO2 28 08/12/2024    CO2 30 05/20/2024       Imaging:  Carotid duplex  Comparison:  Compared with study from 2024, no significant change.    Assessment & Plan:  Jon MEEHAN" Mynor is 80 y.o. male here for follow up of routine surveillance s/p right CEA 2018 w/CVA, to note he is off aspirin due to Chronic lymphocytic leukemia- since 1994, Hagen stage II, in complete remission, status post four cycles of fludarabine and Rituxan from August 2004 through October 2004.     Patient was educated on the signs and symptoms of amaurosis fugax, TIA or stroke and told to call 911 or come to the emergency room if these develop.     It was a pleasure taking care of you today and appreciate your seeing us at our [facility].     Today's plan is as follows:  1) Continue to take lipitor daily  2) Complete ultrasound of your carotids in 1 yr. Follow-up with me after ultrasound is completed    María Parra DNP, APRN-CNP, AGPCNP-C, FNP-C, AGACNP-BC  Senior Nurse Practitioner, Vascular Surgery  [facility], [facility]  [address] [facility], Office [phone]

## 2025-07-14 DIAGNOSIS — R97.20 ELEVATED PSA: ICD-10-CM

## 2025-08-11 ENCOUNTER — APPOINTMENT (OUTPATIENT)
Dept: DERMATOLOGY | Facility: CLINIC | Age: 80
End: 2025-08-11
Payer: MEDICARE

## 2025-10-13 ENCOUNTER — APPOINTMENT (OUTPATIENT)
Dept: HEMATOLOGY/ONCOLOGY | Facility: HOSPITAL | Age: 80
End: 2025-10-13
Payer: MEDICARE

## 2025-11-21 ENCOUNTER — APPOINTMENT (OUTPATIENT)
Dept: PRIMARY CARE | Facility: CLINIC | Age: 80
End: 2025-11-21
Payer: MEDICARE